# Patient Record
Sex: FEMALE | Race: WHITE | NOT HISPANIC OR LATINO | Employment: OTHER | URBAN - METROPOLITAN AREA
[De-identification: names, ages, dates, MRNs, and addresses within clinical notes are randomized per-mention and may not be internally consistent; named-entity substitution may affect disease eponyms.]

---

## 2017-01-01 ENCOUNTER — LAB CONVERSION - ENCOUNTER (OUTPATIENT)
Dept: OTHER | Facility: OTHER | Age: 67
End: 2017-01-01

## 2017-01-01 ENCOUNTER — GENERIC CONVERSION - ENCOUNTER (OUTPATIENT)
Dept: OTHER | Facility: OTHER | Age: 67
End: 2017-01-01

## 2017-01-01 ENCOUNTER — ALLSCRIPTS OFFICE VISIT (OUTPATIENT)
Dept: OTHER | Facility: OTHER | Age: 67
End: 2017-01-01

## 2017-01-01 ENCOUNTER — HOSPITAL ENCOUNTER (OUTPATIENT)
Dept: NON INVASIVE DIAGNOSTICS | Facility: CLINIC | Age: 67
Discharge: HOME | End: 2017-04-25
Payer: COMMERCIAL

## 2017-01-01 ENCOUNTER — ALLSCRIPTS OFFICE VISIT (OUTPATIENT)
Dept: RADIOLOGY | Facility: MEDICAL CENTER | Age: 67
End: 2017-01-01
Payer: COMMERCIAL

## 2017-01-01 ENCOUNTER — HOSPITAL ENCOUNTER (OUTPATIENT)
Dept: NON INVASIVE DIAGNOSTICS | Facility: CLINIC | Age: 67
Discharge: HOME | End: 2017-06-30
Payer: COMMERCIAL

## 2017-01-01 ENCOUNTER — HOSPITAL ENCOUNTER (OUTPATIENT)
Dept: MAMMOGRAPHY | Facility: HOSPITAL | Age: 67
Discharge: HOME | End: 2017-11-20
Payer: COMMERCIAL

## 2017-01-01 DIAGNOSIS — I65.23 OCCLUSION AND STENOSIS OF BILATERAL CAROTID ARTERIES: ICD-10-CM

## 2017-01-01 DIAGNOSIS — I73.9 PERIPHERAL VASCULAR DISEASE (HCC): ICD-10-CM

## 2017-01-01 DIAGNOSIS — E11.9 TYPE 2 DIABETES MELLITUS WITHOUT COMPLICATIONS (HCC): ICD-10-CM

## 2017-01-01 DIAGNOSIS — Z12.31 ENCOUNTER FOR SCREENING MAMMOGRAM FOR MALIGNANT NEOPLASM OF BREAST: ICD-10-CM

## 2017-01-01 LAB
A/G RATIO (HISTORICAL): 1.5 (CALC) (ref 1–2.5)
ALBUMIN SERPL BCP-MCNC: 3.7 G/DL (ref 3.6–5.1)
ALP SERPL-CCNC: 35 U/L (ref 33–130)
ALT SERPL W P-5'-P-CCNC: 9 U/L (ref 6–29)
AST SERPL W P-5'-P-CCNC: 12 U/L (ref 10–35)
BASOPHILS # BLD AUTO: 0.3 %
BASOPHILS # BLD AUTO: 29 CELLS/UL (ref 0–200)
BILIRUB SERPL-MCNC: 0.4 MG/DL (ref 0.2–1.2)
BUN SERPL-MCNC: 22 MG/DL (ref 7–25)
BUN/CREA RATIO (HISTORICAL): 18 (CALC) (ref 6–22)
CALCIUM SERPL-MCNC: 9.7 MG/DL (ref 8.6–10.4)
CHLORIDE SERPL-SCNC: 108 MMOL/L (ref 98–110)
CHOLEST SERPL-MCNC: 160 MG/DL (ref 125–200)
CHOLEST/HDLC SERPL: 3.3 (CALC)
CO2 SERPL-SCNC: 28 MMOL/L (ref 20–31)
CREAT SERPL-MCNC: 1.24 MG/DL (ref 0.5–0.99)
CREATININE, RANDOM URINE (HISTORICAL): 159 MG/DL (ref 20–320)
DEPRECATED RDW RBC AUTO: 16.8 % (ref 11–15)
EGFR AFRICAN AMERICAN (HISTORICAL): 51 ML/MIN/1.73M2
EGFR-AMERICAN CALC (HISTORICAL): 44 ML/MIN/1.73M2
EOSINOPHIL # BLD AUTO: 1.7 %
EOSINOPHIL # BLD AUTO: 163 CELLS/UL (ref 15–500)
GAMMA GLOBULIN (HISTORICAL): 2.4 G/DL (CALC) (ref 1.9–3.7)
GLUCOSE (HISTORICAL): 76 MG/DL (ref 65–99)
HBA1C MFR BLD HPLC: 5 % OF TOTAL HGB
HBA1C MFR BLD HPLC: 6.1 % OF TOTAL HGB
HCT VFR BLD AUTO: 42.7 % (ref 35–45)
HDLC SERPL-MCNC: 49 MG/DL
HGB BLD-MCNC: 14.1 G/DL (ref 11.7–15.5)
LDL CHOLESTEROL (HISTORICAL): 72 MG/DL (CALC)
LYMPHOCYTES # BLD AUTO: 3706 CELLS/UL (ref 850–3900)
LYMPHOCYTES # BLD AUTO: 38.6 %
MAGNESIUM, UR (HISTORICAL): 1.7 MG/DL
MCH RBC QN AUTO: 34.3 PG (ref 27–33)
MCHC RBC AUTO-ENTMCNC: 32.9 G/DL (ref 32–36)
MCV RBC AUTO: 104.1 FL (ref 80–100)
MICROALBUMIN/CREATININE RATIO (HISTORICAL): 11 MCG/MG CREAT
MONOCYTES # BLD AUTO: 931 CELLS/UL (ref 200–950)
MONOCYTES (HISTORICAL): 9.7 %
NEUTROPHILS # BLD AUTO: 4771 CELLS/UL (ref 1500–7800)
NEUTROPHILS # BLD AUTO: 49.7 %
NON-HDL-CHOL (CHOL-HDL) (HISTORICAL): 111 MG/DL (CALC)
PLATELET # BLD AUTO: 116 THOUSAND/UL (ref 140–400)
PMV BLD AUTO: 10.3 FL (ref 7.5–12.5)
POTASSIUM SERPL-SCNC: 4 MMOL/L (ref 3.5–5.3)
RBC # BLD AUTO: 4.1 MILLION/UL (ref 3.8–5.1)
SODIUM SERPL-SCNC: 142 MMOL/L (ref 135–146)
TESTOSTERONE FREE (HISTORICAL): 1.8 PG/ML (ref 0.1–6.4)
TESTOSTERONE TOTAL (HISTORICAL): 13 NG/DL (ref 2–45)
TOTAL PROTEIN (HISTORICAL): 6.1 G/DL (ref 6.1–8.1)
TRIGL SERPL-MCNC: 195 MG/DL
WBC # BLD AUTO: 9.6 THOUSAND/UL (ref 3.8–10.8)

## 2017-01-01 PROCEDURE — G0202 SCR MAMMO BI INCL CAD: HCPCS

## 2017-01-01 PROCEDURE — 93925 LOWER EXTREMITY STUDY: CPT

## 2017-01-01 PROCEDURE — 93923 UPR/LXTR ART STDY 3+ LVLS: CPT

## 2017-01-01 PROCEDURE — 93880 EXTRACRANIAL BILAT STUDY: CPT

## 2017-01-03 ENCOUNTER — HOSPITAL ENCOUNTER (OUTPATIENT)
Dept: RADIOLOGY | Facility: HOSPITAL | Age: 67
Discharge: HOME | End: 2017-01-03
Attending: SURGERY
Payer: COMMERCIAL

## 2017-01-03 VITALS
DIASTOLIC BLOOD PRESSURE: 60 MMHG | TEMPERATURE: 97.3 F | HEART RATE: 62 BPM | SYSTOLIC BLOOD PRESSURE: 127 MMHG | HEIGHT: 63 IN | RESPIRATION RATE: 16 BRPM | BODY MASS INDEX: 31.18 KG/M2 | WEIGHT: 176 LBS | OXYGEN SATURATION: 96 %

## 2017-01-03 DIAGNOSIS — I73.9 PERIPHERAL VASCULAR DISEASE, UNSPECIFIED (HCC): ICD-10-CM

## 2017-01-03 PROCEDURE — 37226 HB FEM/POPL REVASC W/STENT: CPT

## 2017-01-03 PROCEDURE — C1769 GUIDE WIRE: HCPCS

## 2017-01-03 PROCEDURE — C1725 CATH, TRANSLUMIN NON-LASER: HCPCS

## 2017-01-03 PROCEDURE — C1894 INTRO/SHEATH, NON-LASER: HCPCS

## 2017-01-03 PROCEDURE — C1876 STENT, NON-COA/NON-COV W/DEL: HCPCS

## 2017-01-03 PROCEDURE — 75625 CONTRAST EXAM ABDOMINL AORTA: CPT

## 2017-01-03 PROCEDURE — C1760 CLOSURE DEV, VASC: HCPCS

## 2017-01-03 PROCEDURE — 75710 ARTERY X-RAYS ARM/LEG: CPT

## 2017-01-03 RX ORDER — FENTANYL CITRATE 50 UG/ML
INJECTION, SOLUTION INTRAMUSCULAR; INTRAVENOUS CODE/TRAUMA/SEDATION MEDICATION
Status: COMPLETED | OUTPATIENT
Start: 2017-01-03 | End: 2017-01-03

## 2017-01-03 RX ORDER — MIDAZOLAM HYDROCHLORIDE 1 MG/ML
INJECTION INTRAMUSCULAR; INTRAVENOUS CODE/TRAUMA/SEDATION MEDICATION
Status: COMPLETED | OUTPATIENT
Start: 2017-01-03 | End: 2017-01-03

## 2017-01-03 RX ORDER — ACETAMINOPHEN 325 MG/1
650 TABLET ORAL ONCE
Status: COMPLETED | OUTPATIENT
Start: 2017-01-03 | End: 2017-01-03

## 2017-01-03 RX ORDER — SODIUM CHLORIDE 9 MG/ML
75 INJECTION, SOLUTION INTRAVENOUS CONTINUOUS
Status: DISCONTINUED | OUTPATIENT
Start: 2017-01-03 | End: 2017-01-04 | Stop reason: HOSPADM

## 2017-01-03 RX ORDER — CLOPIDOGREL BISULFATE 75 MG/1
75 TABLET ORAL DAILY
Status: DISCONTINUED | OUTPATIENT
Start: 2017-01-03 | End: 2017-01-04 | Stop reason: HOSPADM

## 2017-01-03 RX ORDER — HEPARIN SODIUM 1000 [USP'U]/ML
INJECTION, SOLUTION INTRAVENOUS; SUBCUTANEOUS CODE/TRAUMA/SEDATION MEDICATION
Status: COMPLETED | OUTPATIENT
Start: 2017-01-03 | End: 2017-01-03

## 2017-01-03 RX ADMIN — FENTANYL CITRATE 25 MCG: 50 INJECTION INTRAMUSCULAR; INTRAVENOUS at 09:46

## 2017-01-03 RX ADMIN — MIDAZOLAM 1 MG: 1 INJECTION INTRAMUSCULAR; INTRAVENOUS at 09:19

## 2017-01-03 RX ADMIN — CLOPIDOGREL BISULFATE 75 MG: 75 TABLET ORAL at 13:31

## 2017-01-03 RX ADMIN — FENTANYL CITRATE 25 MCG: 50 INJECTION INTRAMUSCULAR; INTRAVENOUS at 09:56

## 2017-01-03 RX ADMIN — FENTANYL CITRATE 50 MCG: 50 INJECTION INTRAMUSCULAR; INTRAVENOUS at 10:06

## 2017-01-03 RX ADMIN — MIDAZOLAM 1 MG: 1 INJECTION INTRAMUSCULAR; INTRAVENOUS at 08:33

## 2017-01-03 RX ADMIN — FENTANYL CITRATE 50 MCG: 50 INJECTION INTRAMUSCULAR; INTRAVENOUS at 08:33

## 2017-01-03 RX ADMIN — ACETAMINOPHEN 650 MG: 325 TABLET, FILM COATED ORAL at 13:15

## 2017-01-03 RX ADMIN — FENTANYL CITRATE 25 MCG: 50 INJECTION INTRAMUSCULAR; INTRAVENOUS at 09:53

## 2017-01-03 RX ADMIN — SODIUM CHLORIDE 75 ML/HR: 0.9 INJECTION, SOLUTION INTRAVENOUS at 07:00

## 2017-01-03 RX ADMIN — IODIXANOL 91.5 ML: 320 INJECTION, SOLUTION INTRAVASCULAR at 15:23

## 2017-01-03 RX ADMIN — HEPARIN SODIUM 5000 UNITS: 1000 INJECTION INTRAVENOUS; SUBCUTANEOUS at 09:19

## 2017-01-03 RX ADMIN — FENTANYL CITRATE 25 MCG: 50 INJECTION INTRAMUSCULAR; INTRAVENOUS at 09:19

## 2017-01-06 ENCOUNTER — ALLSCRIPTS OFFICE VISIT (OUTPATIENT)
Dept: OTHER | Facility: OTHER | Age: 67
End: 2017-01-06

## 2017-01-20 ENCOUNTER — GENERIC CONVERSION - ENCOUNTER (OUTPATIENT)
Dept: OTHER | Facility: OTHER | Age: 67
End: 2017-01-20

## 2017-01-25 ENCOUNTER — ALLSCRIPTS OFFICE VISIT (OUTPATIENT)
Dept: OTHER | Facility: OTHER | Age: 67
End: 2017-01-25

## 2017-02-14 ENCOUNTER — GENERIC CONVERSION - ENCOUNTER (OUTPATIENT)
Dept: OTHER | Facility: OTHER | Age: 67
End: 2017-02-14

## 2018-01-01 ENCOUNTER — ANESTHESIA (OUTPATIENT)
Dept: PERIOP | Facility: HOSPITAL | Age: 68
End: 2018-01-01
Payer: COMMERCIAL

## 2018-01-01 ENCOUNTER — HOSPITAL ENCOUNTER (OUTPATIENT)
Facility: HOSPITAL | Age: 68
Setting detail: OUTPATIENT SURGERY
Discharge: HOME | End: 2018-02-15
Attending: INTERNAL MEDICINE | Admitting: INTERNAL MEDICINE
Payer: COMMERCIAL

## 2018-01-01 ENCOUNTER — HOSPITAL ENCOUNTER (INPATIENT)
Facility: HOSPITAL | Age: 68
LOS: 3 days | Discharge: HOME | DRG: 683 | End: 2018-01-28
Attending: EMERGENCY MEDICINE | Admitting: INTERNAL MEDICINE
Payer: COMMERCIAL

## 2018-01-01 ENCOUNTER — HOSPITAL ENCOUNTER (OUTPATIENT)
Facility: HOSPITAL | Age: 68
Setting detail: OUTPATIENT SURGERY
End: 2018-01-01
Attending: INTERNAL MEDICINE | Admitting: INTERNAL MEDICINE
Payer: COMMERCIAL

## 2018-01-01 ENCOUNTER — OFFICE VISIT (OUTPATIENT)
Dept: VASCULAR SURGERY | Facility: CLINIC | Age: 68
End: 2018-01-01
Payer: COMMERCIAL

## 2018-01-01 ENCOUNTER — ANESTHESIA EVENT (OUTPATIENT)
Dept: PERIOP | Facility: HOSPITAL | Age: 68
End: 2018-01-01
Payer: COMMERCIAL

## 2018-01-01 ENCOUNTER — APPOINTMENT (EMERGENCY)
Dept: CT IMAGING | Facility: HOSPITAL | Age: 68
DRG: 683 | End: 2018-01-01
Payer: COMMERCIAL

## 2018-01-01 ENCOUNTER — TELEPHONE (OUTPATIENT)
Dept: FAMILY MEDICINE CLINIC | Facility: CLINIC | Age: 68
End: 2018-01-01

## 2018-01-01 ENCOUNTER — GENERIC CONVERSION - ENCOUNTER (OUTPATIENT)
Dept: OTHER | Facility: OTHER | Age: 68
End: 2018-01-01

## 2018-01-01 ENCOUNTER — APPOINTMENT (INPATIENT)
Dept: NON INVASIVE DIAGNOSTICS | Facility: HOSPITAL | Age: 68
DRG: 683 | End: 2018-01-01
Payer: COMMERCIAL

## 2018-01-01 ENCOUNTER — OFFICE VISIT (OUTPATIENT)
Dept: FAMILY MEDICINE CLINIC | Facility: CLINIC | Age: 68
End: 2018-01-01
Payer: COMMERCIAL

## 2018-01-01 ENCOUNTER — OFFICE VISIT (OUTPATIENT)
Dept: GASTROENTEROLOGY | Facility: MEDICAL CENTER | Age: 68
End: 2018-01-01
Payer: COMMERCIAL

## 2018-01-01 ENCOUNTER — TRANSITIONAL CARE MANAGEMENT (OUTPATIENT)
Dept: FAMILY MEDICINE CLINIC | Facility: CLINIC | Age: 68
End: 2018-01-01

## 2018-01-01 ENCOUNTER — TELEPHONE (OUTPATIENT)
Dept: VASCULAR SURGERY | Facility: CLINIC | Age: 68
End: 2018-01-01

## 2018-01-01 ENCOUNTER — GENERIC CONVERSION - ENCOUNTER (OUTPATIENT)
Dept: FAMILY MEDICINE CLINIC | Facility: CLINIC | Age: 68
End: 2018-01-01

## 2018-01-01 VITALS
RESPIRATION RATE: 12 BRPM | SYSTOLIC BLOOD PRESSURE: 136 MMHG | BODY MASS INDEX: 31.89 KG/M2 | HEART RATE: 64 BPM | DIASTOLIC BLOOD PRESSURE: 54 MMHG | HEIGHT: 63 IN | WEIGHT: 180 LBS

## 2018-01-01 VITALS
BODY MASS INDEX: 29.41 KG/M2 | OXYGEN SATURATION: 93 % | WEIGHT: 166 LBS | RESPIRATION RATE: 20 BRPM | HEIGHT: 63 IN | HEART RATE: 96 BPM | DIASTOLIC BLOOD PRESSURE: 99 MMHG | SYSTOLIC BLOOD PRESSURE: 132 MMHG | TEMPERATURE: 98.6 F

## 2018-01-01 VITALS
RESPIRATION RATE: 12 BRPM | DIASTOLIC BLOOD PRESSURE: 62 MMHG | BODY MASS INDEX: 33.31 KG/M2 | SYSTOLIC BLOOD PRESSURE: 140 MMHG | HEART RATE: 64 BPM | WEIGHT: 188 LBS | HEIGHT: 63 IN

## 2018-01-01 VITALS
WEIGHT: 176 LBS | SYSTOLIC BLOOD PRESSURE: 142 MMHG | SYSTOLIC BLOOD PRESSURE: 120 MMHG | HEART RATE: 54 BPM | DIASTOLIC BLOOD PRESSURE: 62 MMHG | HEIGHT: 63 IN | BODY MASS INDEX: 29.64 KG/M2 | OXYGEN SATURATION: 92 % | TEMPERATURE: 98 F | WEIGHT: 167.33 LBS | DIASTOLIC BLOOD PRESSURE: 78 MMHG | BODY MASS INDEX: 31.18 KG/M2 | RESPIRATION RATE: 16 BRPM

## 2018-01-01 VITALS
WEIGHT: 187 LBS | HEIGHT: 63 IN | BODY MASS INDEX: 33.13 KG/M2 | DIASTOLIC BLOOD PRESSURE: 78 MMHG | SYSTOLIC BLOOD PRESSURE: 118 MMHG

## 2018-01-01 VITALS
SYSTOLIC BLOOD PRESSURE: 132 MMHG | HEIGHT: 63 IN | DIASTOLIC BLOOD PRESSURE: 78 MMHG | WEIGHT: 186.5 LBS | BODY MASS INDEX: 33.04 KG/M2

## 2018-01-01 VITALS
DIASTOLIC BLOOD PRESSURE: 72 MMHG | HEART RATE: 72 BPM | BODY MASS INDEX: 31.82 KG/M2 | SYSTOLIC BLOOD PRESSURE: 132 MMHG | HEIGHT: 63 IN | WEIGHT: 179.6 LBS | RESPIRATION RATE: 14 BRPM

## 2018-01-01 VITALS
DIASTOLIC BLOOD PRESSURE: 70 MMHG | HEIGHT: 63 IN | SYSTOLIC BLOOD PRESSURE: 120 MMHG | TEMPERATURE: 95.2 F | BODY MASS INDEX: 31.56 KG/M2 | WEIGHT: 178.13 LBS

## 2018-01-01 VITALS
WEIGHT: 162 LBS | DIASTOLIC BLOOD PRESSURE: 70 MMHG | HEIGHT: 63 IN | TEMPERATURE: 98.5 F | SYSTOLIC BLOOD PRESSURE: 140 MMHG | BODY MASS INDEX: 28.7 KG/M2 | HEART RATE: 102 BPM

## 2018-01-01 VITALS
DIASTOLIC BLOOD PRESSURE: 84 MMHG | WEIGHT: 162.8 LBS | RESPIRATION RATE: 18 BRPM | BODY MASS INDEX: 28.84 KG/M2 | HEIGHT: 63 IN | SYSTOLIC BLOOD PRESSURE: 132 MMHG

## 2018-01-01 DIAGNOSIS — I65.23 BILATERAL CAROTID ARTERY STENOSIS: Primary | ICD-10-CM

## 2018-01-01 DIAGNOSIS — Z72.0 TOBACCO ABUSE: ICD-10-CM

## 2018-01-01 DIAGNOSIS — K27.9 PEPTIC ULCER DISEASE: ICD-10-CM

## 2018-01-01 DIAGNOSIS — E86.0 DEHYDRATION: Primary | ICD-10-CM

## 2018-01-01 DIAGNOSIS — N17.9 ACUTE RENAL FAILURE, UNSPECIFIED ACUTE RENAL FAILURE TYPE (HCC): Primary | ICD-10-CM

## 2018-01-01 DIAGNOSIS — Z94.0 RENAL TRANSPLANT, STATUS POST: ICD-10-CM

## 2018-01-01 DIAGNOSIS — N17.9 ACUTE RENAL FAILURE (ARF) (HCC): ICD-10-CM

## 2018-01-01 DIAGNOSIS — E11.22 TYPE 2 DIABETES MELLITUS WITH STAGE 3 CHRONIC KIDNEY DISEASE, WITHOUT LONG-TERM CURRENT USE OF INSULIN (HCC): ICD-10-CM

## 2018-01-01 DIAGNOSIS — K55.1 MESENTERIC ARTERY STENOSIS (HCC): ICD-10-CM

## 2018-01-01 DIAGNOSIS — I73.9 PAD (PERIPHERAL ARTERY DISEASE) (HCC): ICD-10-CM

## 2018-01-01 DIAGNOSIS — N18.30 CHRONIC KIDNEY DISEASE, STAGE 3 (HCC): ICD-10-CM

## 2018-01-01 DIAGNOSIS — K55.1 MESENTERIC ARTERY STENOSIS (HCC): Primary | ICD-10-CM

## 2018-01-01 DIAGNOSIS — K55.1 CHRONIC MESENTERIC ISCHEMIA (HCC): ICD-10-CM

## 2018-01-01 DIAGNOSIS — N18.30 TYPE 2 DIABETES MELLITUS WITH STAGE 3 CHRONIC KIDNEY DISEASE, WITHOUT LONG-TERM CURRENT USE OF INSULIN (HCC): ICD-10-CM

## 2018-01-01 DIAGNOSIS — K27.9 PUD (PEPTIC ULCER DISEASE): Primary | ICD-10-CM

## 2018-01-01 DIAGNOSIS — K21.9 GERD WITHOUT ESOPHAGITIS: Primary | ICD-10-CM

## 2018-01-01 DIAGNOSIS — I25.10 CORONARY ARTERY DISEASE INVOLVING NATIVE CORONARY ARTERY OF NATIVE HEART WITHOUT ANGINA PECTORIS: ICD-10-CM

## 2018-01-01 DIAGNOSIS — K55.1 SMA STENOSIS: ICD-10-CM

## 2018-01-01 DIAGNOSIS — R10.13 EPIGASTRIC ABDOMINAL PAIN: ICD-10-CM

## 2018-01-01 DIAGNOSIS — I10 ESSENTIAL HYPERTENSION: ICD-10-CM

## 2018-01-01 DIAGNOSIS — I71.4 ABDOMINAL AORTIC ANEURYSM (AAA) 3.0 CM TO 5.0 CM IN DIAMETER IN FEMALE (HCC): ICD-10-CM

## 2018-01-01 LAB
ALBUMIN SERPL BCP-MCNC: 2.9 G/DL (ref 3.5–5)
ALP SERPL-CCNC: 51 U/L (ref 46–116)
ALT SERPL W P-5'-P-CCNC: 14 U/L (ref 12–78)
ANION GAP SERPL CALCULATED.3IONS-SCNC: 11 MMOL/L (ref 4–13)
ANION GAP SERPL CALCULATED.3IONS-SCNC: 6 MMOL/L (ref 4–13)
ANION GAP SERPL CALCULATED.3IONS-SCNC: 7 MMOL/L (ref 4–13)
ANION GAP SERPL CALCULATED.3IONS-SCNC: 8 MMOL/L (ref 4–13)
AST SERPL W P-5'-P-CCNC: 16 U/L (ref 5–45)
BASOPHILS # BLD AUTO: 0.01 THOUSANDS/ΜL (ref 0–0.1)
BASOPHILS NFR BLD AUTO: 0 % (ref 0–1)
BILIRUB SERPL-MCNC: 0.56 MG/DL (ref 0.2–1)
BUN SERPL-MCNC: 10 MG/DL (ref 5–25)
BUN SERPL-MCNC: 13 MG/DL (ref 5–25)
BUN SERPL-MCNC: 17 MG/DL (ref 5–25)
BUN SERPL-MCNC: 9 MG/DL (ref 5–25)
C DIFF TOX GENS STL QL NAA+PROBE: NORMAL
CALCIUM SERPL-MCNC: 8.6 MG/DL (ref 8.3–10.1)
CALCIUM SERPL-MCNC: 8.7 MG/DL (ref 8.3–10.1)
CALCIUM SERPL-MCNC: 9.2 MG/DL (ref 8.3–10.1)
CALCIUM SERPL-MCNC: 9.7 MG/DL (ref 8.3–10.1)
CAMPYLOBACTER DNA SPEC NAA+PROBE: NORMAL
CHLORIDE SERPL-SCNC: 102 MMOL/L (ref 100–108)
CHLORIDE SERPL-SCNC: 106 MMOL/L (ref 100–108)
CHLORIDE SERPL-SCNC: 107 MMOL/L (ref 100–108)
CHLORIDE SERPL-SCNC: 109 MMOL/L (ref 100–108)
CO2 SERPL-SCNC: 26 MMOL/L (ref 21–32)
CO2 SERPL-SCNC: 26 MMOL/L (ref 21–32)
CO2 SERPL-SCNC: 28 MMOL/L (ref 21–32)
CO2 SERPL-SCNC: 28 MMOL/L (ref 21–32)
CREAT SERPL-MCNC: 1.04 MG/DL (ref 0.6–1.3)
CREAT SERPL-MCNC: 1.04 MG/DL (ref 0.6–1.3)
CREAT SERPL-MCNC: 1.23 MG/DL (ref 0.6–1.3)
CREAT SERPL-MCNC: 1.63 MG/DL (ref 0.6–1.3)
EOSINOPHIL # BLD AUTO: 0.06 THOUSAND/ΜL (ref 0–0.61)
EOSINOPHIL NFR BLD AUTO: 1 % (ref 0–6)
ERYTHROCYTE [DISTWIDTH] IN BLOOD BY AUTOMATED COUNT: 16 % (ref 11.6–15.1)
ERYTHROCYTE [DISTWIDTH] IN BLOOD BY AUTOMATED COUNT: 16.1 % (ref 11.6–15.1)
GFR SERPL CREATININE-BSD FRML MDRD: 32 ML/MIN/1.73SQ M
GFR SERPL CREATININE-BSD FRML MDRD: 45 ML/MIN/1.73SQ M
GFR SERPL CREATININE-BSD FRML MDRD: 55 ML/MIN/1.73SQ M
GFR SERPL CREATININE-BSD FRML MDRD: 55 ML/MIN/1.73SQ M
GLUCOSE SERPL-MCNC: 101 MG/DL (ref 65–140)
GLUCOSE SERPL-MCNC: 103 MG/DL (ref 65–140)
GLUCOSE SERPL-MCNC: 125 MG/DL (ref 65–140)
GLUCOSE SERPL-MCNC: 167 MG/DL (ref 65–140)
HCT VFR BLD AUTO: 50.4 % (ref 34.8–46.1)
HCT VFR BLD AUTO: 54.4 % (ref 34.8–46.1)
HGB BLD-MCNC: 15.9 G/DL (ref 11.5–15.4)
HGB BLD-MCNC: 18.3 G/DL (ref 11.5–15.4)
LIPASE SERPL-CCNC: 63 U/L (ref 73–393)
LYMPHOCYTES # BLD AUTO: 3.22 THOUSANDS/ΜL (ref 0.6–4.47)
LYMPHOCYTES NFR BLD AUTO: 28 % (ref 14–44)
MAGNESIUM SERPL-MCNC: 2 MG/DL (ref 1.6–2.6)
MCH RBC QN AUTO: 32.3 PG (ref 26.8–34.3)
MCH RBC QN AUTO: 34.3 PG (ref 26.8–34.3)
MCHC RBC AUTO-ENTMCNC: 31.5 G/DL (ref 31.4–37.4)
MCHC RBC AUTO-ENTMCNC: 33.6 G/DL (ref 31.4–37.4)
MCV RBC AUTO: 102 FL (ref 82–98)
MCV RBC AUTO: 102 FL (ref 82–98)
MONOCYTES # BLD AUTO: 1.16 THOUSAND/ΜL (ref 0.17–1.22)
MONOCYTES NFR BLD AUTO: 10 % (ref 4–12)
NEUTROPHILS # BLD AUTO: 6.94 THOUSANDS/ΜL (ref 1.85–7.62)
NEUTS SEG NFR BLD AUTO: 61 % (ref 43–75)
NRBC BLD AUTO-RTO: 0 /100 WBCS
PLATELET # BLD AUTO: 104 THOUSANDS/UL (ref 149–390)
PLATELET # BLD AUTO: 119 THOUSANDS/UL (ref 149–390)
PMV BLD AUTO: 12 FL (ref 8.9–12.7)
PMV BLD AUTO: 12.9 FL (ref 8.9–12.7)
POTASSIUM SERPL-SCNC: 3.2 MMOL/L (ref 3.5–5.3)
POTASSIUM SERPL-SCNC: 3.6 MMOL/L (ref 3.5–5.3)
POTASSIUM SERPL-SCNC: 3.7 MMOL/L (ref 3.5–5.3)
POTASSIUM SERPL-SCNC: 3.8 MMOL/L (ref 3.5–5.3)
PROT SERPL-MCNC: 6.5 G/DL (ref 6.4–8.2)
RBC # BLD AUTO: 4.92 MILLION/UL (ref 3.81–5.12)
RBC # BLD AUTO: 5.33 MILLION/UL (ref 3.81–5.12)
SALMONELLA DNA SPEC QL NAA+PROBE: NORMAL
SHIGA TOXIN STX GENE SPEC NAA+PROBE: NORMAL
SHIGELLA DNA SPEC QL NAA+PROBE: NORMAL
SODIUM SERPL-SCNC: 140 MMOL/L (ref 136–145)
SODIUM SERPL-SCNC: 140 MMOL/L (ref 136–145)
SODIUM SERPL-SCNC: 141 MMOL/L (ref 136–145)
SODIUM SERPL-SCNC: 143 MMOL/L (ref 136–145)
TACROLIMUS BLD-MCNC: 4.8 NG/ML (ref 2–20)
VALPROATE SERPL-MCNC: 13 UG/ML (ref 50–100)
WBC # BLD AUTO: 11.39 THOUSAND/UL (ref 4.31–10.16)
WBC # BLD AUTO: 7.84 THOUSAND/UL (ref 4.31–10.16)

## 2018-01-01 PROCEDURE — 99223 1ST HOSP IP/OBS HIGH 75: CPT | Performed by: PHYSICIAN ASSISTANT

## 2018-01-01 PROCEDURE — 99495 TRANSJ CARE MGMT MOD F2F 14D: CPT | Performed by: FAMILY MEDICINE

## 2018-01-01 PROCEDURE — 80048 BASIC METABOLIC PNL TOTAL CA: CPT | Performed by: INTERNAL MEDICINE

## 2018-01-01 PROCEDURE — 88342 IMHCHEM/IMCYTCHM 1ST ANTB: CPT | Performed by: PATHOLOGY

## 2018-01-01 PROCEDURE — 80048 BASIC METABOLIC PNL TOTAL CA: CPT | Performed by: PHYSICIAN ASSISTANT

## 2018-01-01 PROCEDURE — 99239 HOSP IP/OBS DSCHRG MGMT >30: CPT | Performed by: INTERNAL MEDICINE

## 2018-01-01 PROCEDURE — 80053 COMPREHEN METABOLIC PANEL: CPT | Performed by: EMERGENCY MEDICINE

## 2018-01-01 PROCEDURE — 88342 IMHCHEM/IMCYTCHM 1ST ANTB: CPT | Performed by: INTERNAL MEDICINE

## 2018-01-01 PROCEDURE — 85027 COMPLETE CBC AUTOMATED: CPT | Performed by: PHYSICIAN ASSISTANT

## 2018-01-01 PROCEDURE — 74176 CT ABD & PELVIS W/O CONTRAST: CPT

## 2018-01-01 PROCEDURE — 99232 SBSQ HOSP IP/OBS MODERATE 35: CPT | Performed by: INTERNAL MEDICINE

## 2018-01-01 PROCEDURE — 85025 COMPLETE CBC W/AUTO DIFF WBC: CPT | Performed by: EMERGENCY MEDICINE

## 2018-01-01 PROCEDURE — 80197 ASSAY OF TACROLIMUS: CPT | Performed by: PHYSICIAN ASSISTANT

## 2018-01-01 PROCEDURE — 83690 ASSAY OF LIPASE: CPT | Performed by: EMERGENCY MEDICINE

## 2018-01-01 PROCEDURE — 99214 OFFICE O/P EST MOD 30 MIN: CPT | Performed by: SURGERY

## 2018-01-01 PROCEDURE — 93975 VASCULAR STUDY: CPT

## 2018-01-01 PROCEDURE — 93976 VASCULAR STUDY: CPT | Performed by: SURGERY

## 2018-01-01 PROCEDURE — 87505 NFCT AGENT DETECTION GI: CPT | Performed by: EMERGENCY MEDICINE

## 2018-01-01 PROCEDURE — 36415 COLL VENOUS BLD VENIPUNCTURE: CPT | Performed by: EMERGENCY MEDICINE

## 2018-01-01 PROCEDURE — 88305 TISSUE EXAM BY PATHOLOGIST: CPT | Performed by: PATHOLOGY

## 2018-01-01 PROCEDURE — 96361 HYDRATE IV INFUSION ADD-ON: CPT

## 2018-01-01 PROCEDURE — 43239 EGD BIOPSY SINGLE/MULTIPLE: CPT | Performed by: INTERNAL MEDICINE

## 2018-01-01 PROCEDURE — 88305 TISSUE EXAM BY PATHOLOGIST: CPT | Performed by: INTERNAL MEDICINE

## 2018-01-01 PROCEDURE — 99285 EMERGENCY DEPT VISIT HI MDM: CPT

## 2018-01-01 PROCEDURE — 87493 C DIFF AMPLIFIED PROBE: CPT | Performed by: EMERGENCY MEDICINE

## 2018-01-01 PROCEDURE — 4040F PNEUMOC VAC/ADMIN/RCVD: CPT | Performed by: INTERNAL MEDICINE

## 2018-01-01 PROCEDURE — 83735 ASSAY OF MAGNESIUM: CPT | Performed by: PHYSICIAN ASSISTANT

## 2018-01-01 PROCEDURE — 80164 ASSAY DIPROPYLACETIC ACD TOT: CPT | Performed by: EMERGENCY MEDICINE

## 2018-01-01 PROCEDURE — 99222 1ST HOSP IP/OBS MODERATE 55: CPT | Performed by: PHYSICIAN ASSISTANT

## 2018-01-01 PROCEDURE — 99204 OFFICE O/P NEW MOD 45 MIN: CPT | Performed by: INTERNAL MEDICINE

## 2018-01-01 PROCEDURE — 96360 HYDRATION IV INFUSION INIT: CPT

## 2018-01-01 RX ORDER — ISOSORBIDE DINITRATE 30 MG/1
30 TABLET ORAL DAILY
COMMUNITY

## 2018-01-01 RX ORDER — CHLORAL HYDRATE 500 MG
1000 CAPSULE ORAL DAILY
Status: DISCONTINUED | OUTPATIENT
Start: 2018-01-01 | End: 2018-01-01 | Stop reason: HOSPADM

## 2018-01-01 RX ORDER — POTASSIUM CHLORIDE 20 MEQ/1
40 TABLET, EXTENDED RELEASE ORAL ONCE
Status: COMPLETED | OUTPATIENT
Start: 2018-01-01 | End: 2018-01-01

## 2018-01-01 RX ORDER — DIVALPROEX SODIUM 500 MG/1
500 TABLET, DELAYED RELEASE ORAL 2 TIMES DAILY
Status: DISCONTINUED | OUTPATIENT
Start: 2018-01-01 | End: 2018-01-01 | Stop reason: HOSPADM

## 2018-01-01 RX ORDER — CLONAZEPAM 0.5 MG/1
0.5 TABLET ORAL EVERY EVENING
Status: DISCONTINUED | OUTPATIENT
Start: 2018-01-01 | End: 2018-01-01 | Stop reason: HOSPADM

## 2018-01-01 RX ORDER — PANTOPRAZOLE SODIUM 40 MG/1
40 TABLET, DELAYED RELEASE ORAL
Qty: 60 TABLET | Refills: 2 | Status: SHIPPED | OUTPATIENT
Start: 2018-01-01

## 2018-01-01 RX ORDER — CALCIUM CARBONATE 500(1250)
1 TABLET ORAL
Status: DISCONTINUED | OUTPATIENT
Start: 2018-01-01 | End: 2018-01-01 | Stop reason: HOSPADM

## 2018-01-01 RX ORDER — MYCOPHENOLIC ACID 180 MG/1
360 TABLET, DELAYED RELEASE ORAL 2 TIMES DAILY
Status: DISCONTINUED | OUTPATIENT
Start: 2018-01-01 | End: 2018-01-01 | Stop reason: HOSPADM

## 2018-01-01 RX ORDER — CLOPIDOGREL BISULFATE 75 MG/1
75 TABLET ORAL DAILY
Status: DISCONTINUED | OUTPATIENT
Start: 2018-01-01 | End: 2018-01-01 | Stop reason: HOSPADM

## 2018-01-01 RX ORDER — AMOXICILLIN 500 MG
1200 CAPSULE ORAL 2 TIMES DAILY
Refills: 0
Start: 2018-01-01

## 2018-01-01 RX ORDER — PANTOPRAZOLE SODIUM 40 MG/1
40 TABLET, DELAYED RELEASE ORAL
Status: DISCONTINUED | OUTPATIENT
Start: 2018-01-01 | End: 2018-01-01 | Stop reason: HOSPADM

## 2018-01-01 RX ORDER — PRAVASTATIN SODIUM 80 MG/1
80 TABLET ORAL
Status: DISCONTINUED | OUTPATIENT
Start: 2018-01-01 | End: 2018-01-01 | Stop reason: HOSPADM

## 2018-01-01 RX ORDER — TACROLIMUS 1 MG/1
2 CAPSULE ORAL 2 TIMES DAILY
Refills: 0
Start: 2018-01-01

## 2018-01-01 RX ORDER — VARENICLINE TARTRATE 25 MG
KIT ORAL
Qty: 53 TABLET | Refills: 0 | Status: SHIPPED | OUTPATIENT
Start: 2018-01-01

## 2018-01-01 RX ORDER — HEPARIN SODIUM 5000 [USP'U]/ML
5000 INJECTION, SOLUTION INTRAVENOUS; SUBCUTANEOUS EVERY 8 HOURS SCHEDULED
Status: DISCONTINUED | OUTPATIENT
Start: 2018-01-01 | End: 2018-01-01 | Stop reason: HOSPADM

## 2018-01-01 RX ORDER — FLUOXETINE HYDROCHLORIDE 20 MG/1
20 CAPSULE ORAL DAILY
Status: DISCONTINUED | OUTPATIENT
Start: 2018-01-01 | End: 2018-01-01 | Stop reason: HOSPADM

## 2018-01-01 RX ORDER — SODIUM CHLORIDE 9 MG/ML
250 INJECTION, SOLUTION INTRAVENOUS CONTINUOUS
Status: DISCONTINUED | OUTPATIENT
Start: 2018-01-01 | End: 2018-01-01

## 2018-01-01 RX ORDER — RANOLAZINE 500 MG/1
500 TABLET, EXTENDED RELEASE ORAL EVERY 12 HOURS
Status: DISCONTINUED | OUTPATIENT
Start: 2018-01-01 | End: 2018-01-01 | Stop reason: HOSPADM

## 2018-01-01 RX ORDER — PREDNISONE 1 MG/1
5 TABLET ORAL DAILY
Status: DISCONTINUED | OUTPATIENT
Start: 2018-01-01 | End: 2018-01-01 | Stop reason: HOSPADM

## 2018-01-01 RX ORDER — PROPOFOL 10 MG/ML
INJECTION, EMULSION INTRAVENOUS AS NEEDED
Status: DISCONTINUED | OUTPATIENT
Start: 2018-01-01 | End: 2018-01-01 | Stop reason: SURG

## 2018-01-01 RX ORDER — ISOSORBIDE MONONITRATE 30 MG/1
30 TABLET, EXTENDED RELEASE ORAL DAILY
Status: DISCONTINUED | OUTPATIENT
Start: 2018-01-01 | End: 2018-01-01 | Stop reason: HOSPADM

## 2018-01-01 RX ORDER — ONDANSETRON 2 MG/ML
4 INJECTION INTRAMUSCULAR; INTRAVENOUS ONCE
Status: COMPLETED | OUTPATIENT
Start: 2018-01-01 | End: 2018-01-01

## 2018-01-01 RX ORDER — HYDRALAZINE HYDROCHLORIDE 20 MG/ML
5 INJECTION INTRAMUSCULAR; INTRAVENOUS EVERY 6 HOURS PRN
Status: DISCONTINUED | OUTPATIENT
Start: 2018-01-01 | End: 2018-01-01 | Stop reason: HOSPADM

## 2018-01-01 RX ORDER — ACETAMINOPHEN 325 MG/1
650 TABLET ORAL EVERY 6 HOURS PRN
Status: DISCONTINUED | OUTPATIENT
Start: 2018-01-01 | End: 2018-01-01 | Stop reason: HOSPADM

## 2018-01-01 RX ORDER — ASPIRIN 81 MG/1
81 TABLET, CHEWABLE ORAL EVERY EVENING
Status: DISCONTINUED | OUTPATIENT
Start: 2018-01-01 | End: 2018-01-01 | Stop reason: HOSPADM

## 2018-01-01 RX ORDER — DULOXETIN HYDROCHLORIDE 30 MG/1
30 CAPSULE, DELAYED RELEASE ORAL DAILY
Status: DISCONTINUED | OUTPATIENT
Start: 2018-01-01 | End: 2018-01-01 | Stop reason: HOSPADM

## 2018-01-01 RX ORDER — SODIUM CHLORIDE 9 MG/ML
INJECTION, SOLUTION INTRAVENOUS CONTINUOUS PRN
Status: DISCONTINUED | OUTPATIENT
Start: 2018-01-01 | End: 2018-01-01 | Stop reason: SURG

## 2018-01-01 RX ORDER — TACROLIMUS 1 MG/1
1 CAPSULE ORAL 2 TIMES DAILY
Status: DISCONTINUED | OUTPATIENT
Start: 2018-01-01 | End: 2018-01-01 | Stop reason: HOSPADM

## 2018-01-01 RX ORDER — CALCIUM CARBONATE 200(500)MG
1000 TABLET,CHEWABLE ORAL DAILY PRN
Status: DISCONTINUED | OUTPATIENT
Start: 2018-01-01 | End: 2018-01-01 | Stop reason: HOSPADM

## 2018-01-01 RX ORDER — SODIUM CHLORIDE 9 MG/ML
50 INJECTION, SOLUTION INTRAVENOUS CONTINUOUS
Status: DISCONTINUED | OUTPATIENT
Start: 2018-01-01 | End: 2018-01-01 | Stop reason: HOSPADM

## 2018-01-01 RX ORDER — ONDANSETRON 2 MG/ML
4 INJECTION INTRAMUSCULAR; INTRAVENOUS EVERY 6 HOURS PRN
Status: DISCONTINUED | OUTPATIENT
Start: 2018-01-01 | End: 2018-01-01 | Stop reason: HOSPADM

## 2018-01-01 RX ORDER — ALENDRONATE SODIUM 70 MG/1
70 TABLET ORAL
Status: ON HOLD | COMMUNITY
End: 2018-01-01 | Stop reason: CLARIF

## 2018-01-01 RX ADMIN — PREDNISONE 5 MG: 5 TABLET ORAL at 15:34

## 2018-01-01 RX ADMIN — DULOXETINE 30 MG: 30 CAPSULE, DELAYED RELEASE ORAL at 15:34

## 2018-01-01 RX ADMIN — PRAVASTATIN SODIUM 80 MG: 80 TABLET ORAL at 15:34

## 2018-01-01 RX ADMIN — MYCOPHENOLIC ACID 360 MG: 180 TABLET, DELAYED RELEASE ORAL at 17:12

## 2018-01-01 RX ADMIN — PREDNISONE 5 MG: 5 TABLET ORAL at 08:42

## 2018-01-01 RX ADMIN — METOPROLOL TARTRATE 25 MG: 25 TABLET ORAL at 17:13

## 2018-01-01 RX ADMIN — DULOXETINE 30 MG: 30 CAPSULE, DELAYED RELEASE ORAL at 08:42

## 2018-01-01 RX ADMIN — Medication 1000 MG: at 08:42

## 2018-01-01 RX ADMIN — SODIUM CHLORIDE 50 ML/HR: 0.9 INJECTION, SOLUTION INTRAVENOUS at 19:28

## 2018-01-01 RX ADMIN — ISOSORBIDE MONONITRATE 30 MG: 30 TABLET, EXTENDED RELEASE ORAL at 09:32

## 2018-01-01 RX ADMIN — CLONAZEPAM 0.5 MG: 0.5 TABLET ORAL at 17:36

## 2018-01-01 RX ADMIN — PRAVASTATIN SODIUM 80 MG: 80 TABLET ORAL at 17:12

## 2018-01-01 RX ADMIN — SODIUM CHLORIDE 50 ML/HR: 0.9 INJECTION, SOLUTION INTRAVENOUS at 23:42

## 2018-01-01 RX ADMIN — DIVALPROEX SODIUM 500 MG: 500 TABLET, DELAYED RELEASE ORAL at 09:08

## 2018-01-01 RX ADMIN — ISOSORBIDE MONONITRATE 30 MG: 30 TABLET, EXTENDED RELEASE ORAL at 08:42

## 2018-01-01 RX ADMIN — ISOSORBIDE MONONITRATE 30 MG: 30 TABLET, EXTENDED RELEASE ORAL at 15:34

## 2018-01-01 RX ADMIN — TACROLIMUS 1 MG: 1 CAPSULE ORAL at 18:48

## 2018-01-01 RX ADMIN — TACROLIMUS 1 MG: 1 CAPSULE ORAL at 09:09

## 2018-01-01 RX ADMIN — MYCOPHENOLIC ACID 360 MG: 180 TABLET, DELAYED RELEASE ORAL at 18:48

## 2018-01-01 RX ADMIN — PANTOPRAZOLE SODIUM 40 MG: 40 TABLET, DELAYED RELEASE ORAL at 05:13

## 2018-01-01 RX ADMIN — ASPIRIN 81 MG 81 MG: 81 TABLET ORAL at 17:13

## 2018-01-01 RX ADMIN — METOPROLOL TARTRATE 25 MG: 25 TABLET ORAL at 17:36

## 2018-01-01 RX ADMIN — ASPIRIN 81 MG 81 MG: 81 TABLET ORAL at 17:34

## 2018-01-01 RX ADMIN — DIVALPROEX SODIUM 500 MG: 500 TABLET, DELAYED RELEASE ORAL at 17:34

## 2018-01-01 RX ADMIN — PANTOPRAZOLE SODIUM 40 MG: 40 TABLET, DELAYED RELEASE ORAL at 05:15

## 2018-01-01 RX ADMIN — Medication 1000 MG: at 09:08

## 2018-01-01 RX ADMIN — IOHEXOL 50 ML: 240 INJECTION, SOLUTION INTRATHECAL; INTRAVASCULAR; INTRAVENOUS; ORAL at 11:07

## 2018-01-01 RX ADMIN — PROPOFOL 120 MG: 10 INJECTION, EMULSION INTRAVENOUS at 11:54

## 2018-01-01 RX ADMIN — METOPROLOL TARTRATE 25 MG: 25 TABLET ORAL at 17:34

## 2018-01-01 RX ADMIN — SODIUM CHLORIDE 100 ML/HR: 0.9 INJECTION, SOLUTION INTRAVENOUS at 07:59

## 2018-01-01 RX ADMIN — Medication 400 MG: at 08:42

## 2018-01-01 RX ADMIN — Medication 400 MG: at 15:35

## 2018-01-01 RX ADMIN — CLONAZEPAM 0.5 MG: 0.5 TABLET ORAL at 17:34

## 2018-01-01 RX ADMIN — FLUOXETINE 20 MG: 20 CAPSULE ORAL at 15:34

## 2018-01-01 RX ADMIN — MYCOPHENOLIC ACID 360 MG: 180 TABLET, DELAYED RELEASE ORAL at 08:42

## 2018-01-01 RX ADMIN — SODIUM CHLORIDE 1000 ML: 0.9 INJECTION, SOLUTION INTRAVENOUS at 08:54

## 2018-01-01 RX ADMIN — CLOPIDOGREL BISULFATE 75 MG: 75 TABLET ORAL at 09:07

## 2018-01-01 RX ADMIN — MYCOPHENOLIC ACID 360 MG: 180 TABLET, DELAYED RELEASE ORAL at 09:31

## 2018-01-01 RX ADMIN — ONDANSETRON 4 MG: 2 INJECTION INTRAMUSCULAR; INTRAVENOUS at 12:25

## 2018-01-01 RX ADMIN — DIVALPROEX SODIUM 500 MG: 500 TABLET, DELAYED RELEASE ORAL at 17:36

## 2018-01-01 RX ADMIN — ACETAMINOPHEN 650 MG: 325 TABLET, FILM COATED ORAL at 09:34

## 2018-01-01 RX ADMIN — HEPARIN SODIUM 5000 UNITS: 5000 INJECTION, SOLUTION INTRAVENOUS; SUBCUTANEOUS at 06:03

## 2018-01-01 RX ADMIN — DIVALPROEX SODIUM 500 MG: 500 TABLET, DELAYED RELEASE ORAL at 17:12

## 2018-01-01 RX ADMIN — Medication 1 TABLET: at 09:08

## 2018-01-01 RX ADMIN — MYCOPHENOLIC ACID 360 MG: 180 TABLET, DELAYED RELEASE ORAL at 09:07

## 2018-01-01 RX ADMIN — Medication 400 MG: at 09:31

## 2018-01-01 RX ADMIN — DULOXETINE 30 MG: 30 CAPSULE, DELAYED RELEASE ORAL at 09:32

## 2018-01-01 RX ADMIN — ACETAMINOPHEN 650 MG: 325 TABLET, FILM COATED ORAL at 08:34

## 2018-01-01 RX ADMIN — FLUOXETINE 20 MG: 20 CAPSULE ORAL at 09:32

## 2018-01-01 RX ADMIN — METOPROLOL TARTRATE 25 MG: 25 TABLET ORAL at 09:08

## 2018-01-01 RX ADMIN — Medication 1000 MG: at 15:35

## 2018-01-01 RX ADMIN — Medication 1 TABLET: at 09:32

## 2018-01-01 RX ADMIN — PREDNISONE 5 MG: 5 TABLET ORAL at 09:32

## 2018-01-01 RX ADMIN — Medication 400 MG: at 09:09

## 2018-01-01 RX ADMIN — MYCOPHENOLIC ACID 360 MG: 180 TABLET, DELAYED RELEASE ORAL at 17:34

## 2018-01-01 RX ADMIN — TACROLIMUS 1 MG: 1 CAPSULE ORAL at 08:42

## 2018-01-01 RX ADMIN — RANOLAZINE 500 MG: 500 TABLET, FILM COATED, EXTENDED RELEASE ORAL at 21:11

## 2018-01-01 RX ADMIN — FOLIC ACID-PYRIDOXINE-CYANOCOBALAMIN TAB 2.5-25-2 MG 1 TABLET: 2.5-25-2 TAB at 09:31

## 2018-01-01 RX ADMIN — RANOLAZINE 500 MG: 500 TABLET, FILM COATED, EXTENDED RELEASE ORAL at 09:31

## 2018-01-01 RX ADMIN — ASPIRIN 81 MG 81 MG: 81 TABLET ORAL at 17:36

## 2018-01-01 RX ADMIN — METOPROLOL TARTRATE 25 MG: 25 TABLET ORAL at 08:42

## 2018-01-01 RX ADMIN — Medication 1000 MG: at 09:31

## 2018-01-01 RX ADMIN — TACROLIMUS 1 MG: 1 CAPSULE ORAL at 17:34

## 2018-01-01 RX ADMIN — PANTOPRAZOLE SODIUM 40 MG: 40 TABLET, DELAYED RELEASE ORAL at 06:03

## 2018-01-01 RX ADMIN — TACROLIMUS 1 MG: 1 CAPSULE ORAL at 09:31

## 2018-01-01 RX ADMIN — CLONAZEPAM 0.5 MG: 0.5 TABLET ORAL at 17:12

## 2018-01-01 RX ADMIN — SODIUM CHLORIDE 250 ML/HR: 0.9 INJECTION, SOLUTION INTRAVENOUS at 13:57

## 2018-01-01 RX ADMIN — METOPROLOL TARTRATE 25 MG: 25 TABLET ORAL at 09:31

## 2018-01-01 RX ADMIN — HEPARIN SODIUM 5000 UNITS: 5000 INJECTION, SOLUTION INTRAVENOUS; SUBCUTANEOUS at 13:21

## 2018-01-01 RX ADMIN — PRAVASTATIN SODIUM 80 MG: 80 TABLET ORAL at 17:34

## 2018-01-01 RX ADMIN — POTASSIUM CHLORIDE 40 MEQ: 1500 TABLET, EXTENDED RELEASE ORAL at 10:25

## 2018-01-01 RX ADMIN — RANOLAZINE 500 MG: 500 TABLET, FILM COATED, EXTENDED RELEASE ORAL at 09:09

## 2018-01-01 RX ADMIN — SODIUM CHLORIDE 100 ML/HR: 0.9 INJECTION, SOLUTION INTRAVENOUS at 15:41

## 2018-01-01 RX ADMIN — CLOPIDOGREL BISULFATE 75 MG: 75 TABLET ORAL at 15:34

## 2018-01-01 RX ADMIN — HEPARIN SODIUM 5000 UNITS: 5000 INJECTION, SOLUTION INTRAVENOUS; SUBCUTANEOUS at 05:13

## 2018-01-01 RX ADMIN — HEPARIN SODIUM 5000 UNITS: 5000 INJECTION, SOLUTION INTRAVENOUS; SUBCUTANEOUS at 21:43

## 2018-01-01 RX ADMIN — RANOLAZINE 500 MG: 500 TABLET, FILM COATED, EXTENDED RELEASE ORAL at 21:43

## 2018-01-01 RX ADMIN — DULOXETINE 30 MG: 30 CAPSULE, DELAYED RELEASE ORAL at 09:09

## 2018-01-01 RX ADMIN — ISOSORBIDE MONONITRATE 30 MG: 30 TABLET, EXTENDED RELEASE ORAL at 09:08

## 2018-01-01 RX ADMIN — FOLIC ACID-PYRIDOXINE-CYANOCOBALAMIN TAB 2.5-25-2 MG 1 TABLET: 2.5-25-2 TAB at 09:07

## 2018-01-01 RX ADMIN — SODIUM CHLORIDE: 0.9 INJECTION, SOLUTION INTRAVENOUS at 11:43

## 2018-01-01 RX ADMIN — RANOLAZINE 500 MG: 500 TABLET, FILM COATED, EXTENDED RELEASE ORAL at 21:51

## 2018-01-01 RX ADMIN — FOLIC ACID-PYRIDOXINE-CYANOCOBALAMIN TAB 2.5-25-2 MG 1 TABLET: 2.5-25-2 TAB at 08:42

## 2018-01-01 RX ADMIN — DIVALPROEX SODIUM 500 MG: 500 TABLET, DELAYED RELEASE ORAL at 08:42

## 2018-01-01 RX ADMIN — ACETAMINOPHEN 650 MG: 325 TABLET, FILM COATED ORAL at 17:40

## 2018-01-01 RX ADMIN — FLUOXETINE 20 MG: 20 CAPSULE ORAL at 09:07

## 2018-01-01 RX ADMIN — HEPARIN SODIUM 5000 UNITS: 5000 INJECTION, SOLUTION INTRAVENOUS; SUBCUTANEOUS at 21:51

## 2018-01-01 RX ADMIN — PREDNISONE 5 MG: 5 TABLET ORAL at 09:08

## 2018-01-01 RX ADMIN — DIVALPROEX SODIUM 500 MG: 500 TABLET, DELAYED RELEASE ORAL at 09:32

## 2018-01-01 RX ADMIN — RANOLAZINE 500 MG: 500 TABLET, FILM COATED, EXTENDED RELEASE ORAL at 08:41

## 2018-01-01 RX ADMIN — Medication 1 TABLET: at 08:42

## 2018-01-01 RX ADMIN — FLUOXETINE 20 MG: 20 CAPSULE ORAL at 08:42

## 2018-01-01 RX ADMIN — TACROLIMUS 1 MG: 1 CAPSULE ORAL at 17:12

## 2018-01-09 NOTE — RESULT NOTES
Message   Recorded as Task   Date: 12/28/2016 08:53 AM, Created By: Keshia Walker   Task Name: Follow Up   Assigned To: [id number] end procedure,Team   Regarding Patient: Caitlyn Escamilla, Status: Active   CommentCorey Linda - 28 Dec 2016 8:53 AM     TASK CREATED  Pt  is S/P RT PIRIFORMIS INJ on 12/21 by Dr Russ Rutledge  F/U is PRN  Steven Hamlin - 28 Dec 2016 12:57 PM     TASK EDITED  pT  REPORTS 50% RELIEF POST INJ ,WITH NO S/SOF INF  F/U is PRN     Sergey Proper - 87 Dec 2016 1:35 PM     TASK REPLIED TO: Previously Assigned To [place] Proper                      aware agree        Signatures   Electronically signed by : Jus Mandujano, ; Dec 28 2016  3:27PM EST                       (Author)

## 2018-01-09 NOTE — RESULT NOTES
Message   Recorded as Task   Date: 03/07/2017 02:14 PM, Created By: Karla Ozuna   Task Name: Follow Up   Assigned To: [id number] [address] clinical,Team   Regarding Patient: Harjit Marley, Status: Active   CommentIngrid Chuy - 07 Mar 2017 2:14 PM     TASK CREATED  Pt  is S/P RT GTB INJ,USGI ON3/01 by Yuliana Rodriguez Feeling  F/U is 4/05 w/AO  Kristy Montero - 08 Mar 2017 11:10 AM     TASK EDITED  MSG LEFT H# FOR PT TO tSeven Baig - 10 Mar 2017 3:28 PM     TASK EDITED  Pt  reports no relief post injection  Pt  pain is awful when she wakes up, but when she takes Tylenol in the morning the pain goes away  F/U is on 4/05 w/AO     Elvie Fernandez - 13 Mar 2017 12:16 PM     TASK EDITED  aware agree        Signatures   Electronically signed by : Kianna Tesfaye, ; Mar 13 2017 12:28PM EST                       (Author)

## 2018-01-10 NOTE — RESULT NOTES
Verified Results  * MRI BRAIN W WO CONTRAST [id number] 01:02PM [facility]     Test Name Result Flag Reference   MRI BRAIN W WO CONTRAST (Report)     MRI BRAIN WITH AND WITHOUT CONTRAST     INDICATION: Episodic headaches since December     COMPARISON: None  TECHNIQUE:   Sagittal T1, axial T2, axial FLAIR, axial T1, axial gradient imaging, axial diffusion  Sagittal, axial and coronal T1 postcontrast       9 mL of Gadavist was injected intravenously without immediate consequence  IMAGE QUALITY:  Diagnostic  FINDINGS:     BRAIN PARENCHYMA:    Numerous tiny T2 and FLAIR hyperintense lesions are present within the subcortical and deep white matter of the frontal and parietal lobes bilaterally    Although nonspecific, these type findings are suspicious for mild chronic microvascular changes    including the type which may be associated with migraine headaches  Clinical correlation recommended since other etiologies including MS or Lyme disease could appear similarly  There is no discrete mass, mass effect or midline shift  Brainstem and cerebellum demonstrate normal signal  There is no intracranial hemorrhage  There is no evidence of acute infarction and diffusion imaging is unremarkable  VENTRICLES: Normal      POSTCONTRAST IMAGING: Postcontrast imaging of the brain demonstrates no abnormal enhancement  SELLA AND PITUITARY GLAND:    Normal appearance     ORBITS: Normal      PARANASAL SINUSES: Normal      VASCULATURE: Evaluation of the major intracranial vasculature demonstrates appropriate flow voids  CALVARIUM AND SKULL BASE: Normal      EXTRACRANIAL SOFT TISSUES: Normal        IMPRESSION:   Numerous tiny nonspecific supratentorial T2 and FLAIR hyperintense foci suspicious for mild chronic microvascular changes which may be associated with migraine headaches  Clinical correlation recommended since other etiologies including MS or Lyme    disease could appear similarly  Signed by:   Shaheed Kuo MD   1/15/16

## 2018-01-10 NOTE — RESULT NOTES
Verified Results  * MAMMO SCREENING BILATERAL W CAD [id number] 10:32AM Chelsey Warner Order Number: [id number]    - Patient Instructions: To schedule this appointment, please contact Central Scheduling at [phone]  Do not wear any perfume, powder, lotion or deodorant on breast or underarm area  Please bring your doctors order, referral (if needed) and insurance information with you on the day of the test  Failure to bring this information may result in this test being rescheduled  Arrive 15 minutes prior to your appointment time to register  On the day of your test, please bring any prior mammogram or breast studies with you that were not performed at a [facility]  Failure to bring prior exams may result in your test needing to be rescheduled  Test Name Result Flag Reference   MAMMO SCREENING BILATERAL W CAD (Report)     Patient History:   Patient is postmenopausal    Family history of breast cancer at age 48 or over in paternal    aunt, unknown cancer in brother, unknown cancer at age 68 in    father  Benign excisional biopsy of the right breast, August 22, 2006  Took hormonal contraceptives for 21 years  Patient is an every day smoker  Patient's BMI is 35 4  Reason for exam: screening, asymptomatic  Mammo Screening Bilateral W CAD: November 20, 2017 - Check In #:    [de-identified]   Bilateral CC and MLO view(s) were taken  Technologist: CHAU Trinidad (CHAU)(M)   Prior study comparison: September 2, 2016, mammo screening    bilateral W CAD, performed at [address] [place] [address]  July 10, 2015, bilateral AMA dig scrn mamm w/CAD    performed at [place] [address]  May 20,    2014, right breast unilateral diagnostic mammogram, performed at    [address] [place] [address]  May 7, 2014, bilateral    digital screening mammogram, performed at [address] [place] [address]   May 6, 2013, bilateral digital screening   mammogram, performed at [place] [address]  May 4, 2012, bilateral digital screening mammogram,    performed at [place] [address]  May 3,    2011, bilateral digital screening mammogram, performed at [place] [address]  The breast tissue is almost entirely fat  Bilateral digital mammography was performed  No dominant soft    tissue mass, architectural distortion or suspicious    calcifications are noted in either breast  The skin and nipple    contours are within normal limits  No evidence of malignancy  No significant changes when compared with prior studies  ACR BI-RADSï¾® Assessments: BiRad:1 - Negative     Recommendation:   Routine screening mammogram of both breasts in 1 year  Analyzed by CAD     The patient is scheduled in a reminder system for screening    mammography  8-10% of cancers will be missed on mammography  Management of a    palpable abnormality must be based on clinical grounds  Patients   will be notified of their results via letter from our facility  Accredited by [employer] and FDA       Transcription Location: [address] [place] [address]: [id number]     Risk Value(s):   Tyrer-Cuzick 10 Year: 4 200%, Tyrer-Cuzick Lifetime: 7 100%,    Myriad Table: 1 5%, ADALBERTO 5 Year: 2 0%, NCI Lifetime: 6 0%   Signed by:   Krystyna Chavez MD   11/21/17

## 2018-01-10 NOTE — RESULT NOTES
Verified Results  * MAMMO SCREENING BILATERAL W CAD [id number] 02:53PM Geetha Raphael Order Number: [id number]     Test Name Result Flag Reference   MAMMO SCREENING BILATERAL W CAD (Report)     Patient History:   Patient is postmenopausal    Family history of unknown cancer in father at age 68, breast    cancer in paternal aunt at age 48 or over, and unknown cancer in    brother  Benign excisional biopsy of the right breast, August 22, 2006  Took hormonal contraceptives for 21 years  Patient is an every day smoker  Patient's BMI is 35 4  Reason for exam: screening (asymptomatic)  Screening     Mammo Screening Bilateral W CAD: September 2, 2016 - Check In #:    [de-identified]   Bilateral CC and MLO view(s) were taken  Technologist: Saqib Sapp, RT(R)(M)   Prior study comparison: July 10, 2015, bilateral AMA dig scrn    mamm w/CAD, performed at [facility]  May 20, 2014, right breast unilateral diagnostic mammogram,    performed at [address] [place] [address]  May 7, 2014,    bilateral digital screening mammogram performed at [address]  May 6, 2013, bilateral digital    scre[facility] mamm[facility] [place] [facility]  May 4, 2012, bilateral digital screening    [place] [address] [facility]  The breast tissue is almost entirely fat  Bilateral digital    mammography is performed  No dominant soft tissue mass,    architectural distortion or suspicious calcifications are noted  The skin and nipple contours are within normal limits  Right    breast postsurgical scarring is unchanged  No evidence of    malignancy  No significant changes when compared to prior    studies  1  No evidence of malignancy  2  No significant change when compared with the prior study       ASSESSMENT: BiRad:2 - Benign     Recommendation:   Routine screening mammogram of both breasts in 1 year  A reminder letter will be scheduled   Analyzed by CAD     8-10% of cancers will be missed on mammography  Management of a    palpable abnormality must be based on clinical grounds  Patients   will be notified of their results via letter from our facility  Accredited by [employer] and FDA       Transcription Location: CHAU Molina [facility]: [id number]     Risk Value(s):   Tyrer-Cuzick 10 Year: 4 179%, Tyrer-Cuzick Lifetime: 7 510%,    Myriad Table: 1 5%, ADALBERTO 5 Year: 1 9%, NCI Lifetime: 6 3%   Signed by:   Harjit Ochoa MD   9/2/16

## 2018-01-11 NOTE — RESULT NOTES
Message   Recorded as Task   Date: 08/31/2016 09:50 AM, Created By: Silvano Iglesias   Task Name: Follow Up   Assigned To: [id number] end procedure,Team   Regarding Patient: Cintia Gomez, Status: Active   CommentLauren Hides - 31 Aug 2016 9:50 AM     TASK CREATED  Pt is S/P REPEAT RT SIJ INJ on 8/24 by Dr Ashley Back  F/U is on 9/06   Silvano Iglesias - 31 Aug 2016 11:06 AM     TASK EDITED   Pt reports no relief post inj , with no S/S of infection and no increase in ability to perform ADL's  Pt  has POVS on 9/06     Jose Castrejon - 31 Aug 2016 11:19 AM     TASK REPLIED TO: Previously Assigned To oJse Castrejon                      aware agree with plan        Signatures   Electronically signed by : David Landrum, ; Aug 31 2016 12:44PM EST                       (Author)

## 2018-01-11 NOTE — RESULT NOTES
Message   Recorded as Task   Date: 08/08/2016 01:01 PM, Created By: Pattie Fontana   Task Name: Follow Up   Assigned To: Swati Guerrero   Regarding Patient: MERCEDES NORIEGA, Status: In Progress   Rod Mayfield - 08 Aug 2016 1:01 PM     TASK CREATED  Caller: Self; General Medical Question; [phone] (Home)  Received call from pt  Pt s/p RT SIJ INJ performed on 7/27  Pt reports no s/s of infection or adverse affect  Pt states that she experienced no relief from this injection  Pt reports current pain to be a 10/10  Pt states tylenol and heat help to relieve the pain temporarily, but does not last very long  Pt requesting another injection  Please provide you recommendtations   Anmol Keith - 08 Aug 2016 3:27 PM     TASK REPLIED TO: Previously Assigned To [address] [place] [address] clinical,Team                      aware, ok to repeat at 2 week jacque to see if we can get better relief second time   Pattie Fontana - 08 Aug 2016 3:59 PM     TASK EDITED  Please contact pt to schedule repeat injection  Swati Guerrero - 08 Aug 2016 4:06 PM     TASK IN PROGRESS   Swati Guerrero - 08 Aug 2016 4:11 PM     TASK EDITED  Called pt sched RPT RT SIJ INJ on 08 24 2016 @ 9:15am for 9:30am; verbal inst given and aware of  needed  However, pt would like to come on Wednesday, if  at all possible; you have a LT GTB THIS WEDNESDAY LAST PROC OF AM  Elidia Lightning Elidia Lightning Let me know    Elidia Lightning Elidia Lightning Oscar Huynh - 09 Aug 2016 7:03 AM     TASK REPLIED TO: Previously Assigned To Thiago Lang you can double book with GTB thanks   [facility] - 10 Aug 2016 11:41 AM     TASK EDITED  I called pt to apologize on getting back to her at this time, being that it was little overwhelming yesterday, pt understood said no worries  Elidia Lightning Elidia Lightning Elidia Lightning Can we put her on for Monday 08 22 2016 sometime in the afternoon?  let me know  Elidia Lightning Elidia Lightning Elidia Lightning Herb Keith - 10 Aug 2016 2:16 PM     TASK REPLIED TO: Previously Assigned To Anmol Keith wed 8/24 at 2:15        Signatures   Electronically signed by : Lianet Rosado, ; Aug 11 2016 11:54AM EST                       (Author)

## 2018-01-11 NOTE — MISCELLANEOUS
Message   Date: 22 Feb 2017 3:18 PM EST, Recorded By: Mari Durham For: [address] end clinical,Team   Caller: [facility] pharmacy , Pharmacist   Reason: Care Coordination   Received tx'd call from Rashmi Ingram at   Caller is [facility] in [employer] calling about an medication interaction  S/w Ericka Garsia from [facility] who states that pt's Tramadol prescribed by Dr Ruth Retana is interacting w/ Fluoxetine, Duloxetine, and Clonazepam  Per Ericka Garsia, Fluoxetine and Duloxetine could cause Serotonin Syndrome when combined w/ Tramadol  Per Ericka Eaton, Clonazepam could cause increased drowsiness when combined w/ Tramadol  S/w Dr Ruth Retana and advised of the same  Per  Dr Ruth Retana, he is aware, and it is okay to fill at this time  S/w Ericka Garsia and advised of the same  Ericka Garsia verbalized understanding and was appreciative  Active Problems    1  Abdominal aortic aneurysm, without rupture (441 4) (I71 4)   2  Arteriosclerosis of coronary artery (414 00) (I25 10)   3  Atherosclerotic PVD with intermittent claudication (440 21) (I73 9)   4  Benign essential hypertension (401 1) (I10)   5  Calcification of aorta (440 0) (I70 0)   6  Carotid stenosis, asymptomatic, bilateral (433 10,433 30) (I65 23)   7  Chronic obstructive pulmonary disease (496) (J44 9)   8  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)   9  Convulsive disorder (780 39) (R56 9)   10  Current smoker (305 1) (F17 200)   11  Depression (311) (F32 9)   12  GERD without esophagitis (530 81) (K21 9)   13  Greater trochanteric bursitis of right hip (726 5) (M70 61)   14  Headache, migraine (346 90) (G43 909)   15  Hyperlipidemia (272 4) (E78 5)   16  Medicare annual wellness visit, subsequent (V70 0) (Z00 00)   17  On anticoagulant therapy (V58 61) (Z79 01)   18  Peripheral arterial disease (443 9) (I73 9)   19  Peripheral neuropathy (356 9) (G62 9)   20  Piriformis syndrome, right (355 0) (G57 01)   21  Sacroiliitis (720 2) (M46 1)   22   Status post renal autotransplantation (V42 0) (Z94 0)   23  Stenosis of left carotid artery (433 10) (I65 22)   24  Type 2 diabetes mellitus (250 00) (E11 9)    Current Meds   1  Acetaminophen 500 MG Oral Tablet; Take 2 caplets every 6 hours as needed  Donot   take more than 6 caplets in 24 hours; Therapy: 82Kgs3913 to (Last Rx:18Apr2016) Ordered   2  Aspirin 81 MG TABS Recorded   3  Bumetanide 1 MG Oral Tablet; Take 1 tablet daily; Therapy: 64WHO2563 to (Evaluate:99Uvd3432)  Requested for: 67RGU0503; Last   Rx:75Cqw1984 Ordered   4  ClonazePAM 0 5 MG Oral Tablet (KlonoPIN); TAKE 1 TABLET AT BEDTIME; Last   Rx:06Jan2017 Ordered   5  Clopidogrel Bisulfate 75 MG Oral Tablet (Plavix); Take 1 tablet daily; Therapy: 10Oru5263 to (Antoinette Arreolastein)  Requested for: 95AAY0271; Last   Rx:06Jan2017 Ordered   6  Divalproex Sodium 500 MG Oral Tablet Delayed Release (Depakote); take 1 tablet twice   a day; Therapy: 25KLJ0777 to (Antoinette Jl)  Requested for: 24QJF1581; Last   Rx:06Jan2017 Ordered   7  DULoxetine HCl - 30 MG Oral Capsule Delayed Release Particles (Cymbalta); take 1   capsule daily; Therapy: 98CLN2833 to (Antoinette Jl)  Requested for: 07KOO1535; Last   Rx:06Jan2017 Ordered   8  Famotidine 40 MG Oral Tablet; take 1 tablet twice a day; Therapy: 43OYA7045 to (Antoinette Jl)  Requested for: 22FKZ3368; Last   Rx:06Jan2017 Ordered   9  Fioricet -40 MG Oral Capsule (Butalbital-APAP-Caffeine); TAKE 1 CAPSULE   Every 6 hours; Therapy: 59SQX8232 to (Evaluate:27Jan2016)  Requested for: 97BMI8631; Last   Rx:19Jan2016 Ordered   10  FLUoxetine HCl - 20 MG Oral Capsule (PROzac); take 1 capsule daily; Therapy: 21Jan2013 to (Antoinette Arreolastein)  Requested for: 23PYP9903; Last    Rx:06Jan2017 Ordered   11  Isosorbide Mononitrate ER 30 MG Oral Tablet Extended Release 24 Hour; Take 1 tablet    daily; Therapy: 10HJF2660 to (Antoinette Parikh)  Requested for: 60AXB2520; Last    Rx:06Jan2017 Ordered   12  Janumet  MG Oral Tablet; take 1 tablet twice a day; Therapy: 44MQL6495 to (Win Roman)  Requested for: 98BAM0151; Last    Rx:06Jan2017 Ordered   15  Lidocaine 5 % External Ointment; APPLY TO AFFECTED AREA FOR BACK PAIN 3    TIMES  A DAY; Therapy: 25TKN1847 to (Evaluate:48Hhq4023)  Requested for: 35YOB1733; Last    SD:56HYB5709 Ordered   14  Magnesium Oxide 500 MG Oral Tablet; Take 1 tablet daily  Requested for: 58HWC7365;    Last Rx:05Oct2012 Ordered   15  Metoprolol Succinate ER 25 MG Oral Tablet Extended Release 24 Hour; take 1 tablet    twice a day; Therapy: 64DPD1409 to (Win Roman)  Requested for: 35XLT0244; Last    Rx:06Jan2017 Ordered   12  Mycophenolate Sodium 360 MG Oral Tablet Delayed Release (Myfortic); take 1 tablet    twice a day; Therapy: 34RMY4044 to (Win Roman)  Requested for: 32XQO8095; Last    Rx:06Jan2017 Ordered   17  [place]-3 Fish Oil 1200 MG Oral Capsule; Take 1 capsule twice daily  Requested for:    18UMX3886; Last Rx:05Oct2012 Ordered   18  Omeprazole 20 MG Oral Capsule Delayed Release; TAKE 1 CAPSULE EVERY          MORNING BEFORE BREAKFAST; Therapy: 84IBA1911 to (Evaluate:47Smr3755)  Requested for: 66KZM5284; Last    Rx:13Nov2016 Ordered   19  Plavix 75 MG Oral Tablet (Clopidogrel Bisulfate); Therapy: (Maris Stark) to Recorded   20  Ranexa 500 MG Oral Tablet Extended Release 12 Hour; Take 1 tablet twice daily     Requested for: 57Pxi7913; Last Rx:30Cxm8444; Status: ACTIVE - Renewal Denied    Ordered   21  Simvastatin 40 MG Oral Tablet; TAKE 1 TABLET DAILY AS     DIRECTED; Therapy: 67YUP7340 to (Win Roman)  Requested for: 97WJX5609; Last    Rx:06Jan2017 Ordered   25  Tacrolimus 1 MG Oral Capsule; TAKE 2 CAPSULE Twice daily; Therapy: (Recorded:08Mbp8170) to Recorded   23  TiZANidine HCl - 4 MG Oral Tablet; take 1 tablet bedtime prn muscle spasm;     Therapy: 99HYC0466 to (Evaluate:07Agb4235)  Requested for: 05AMA5650; Last Rx: 48MNX1486 Ordered   24  TraMADol HCl - 50 MG Oral Tablet; TAKE 1 TABLET Twice daily PRN; Therapy: 04KXV1722 to (Evaluate:08Mar2017); Last Rx:69Jrs0552 Ordered    Allergies    1   No Known Drug Allergies    Signatures   Electronically signed by : Latonia Martinez RN; Feb 22 2017  3:24PM EST                       (Author)

## 2018-01-11 NOTE — RESULT NOTES
Verified Results  [address] [id number] 09:17AM Geovanna Grit Order Number: [id number]     Test Name Result Flag Reference   US ABDOMINAL AORTA (Report)     ABDOMINAL AORTIC ULTRASOUND     INDICATION: Evaluate for aortic aneurysm  COMPARISON: 73-year-old female with low back pain and suspected calcified saccular aortic aneurysm described on lumbar spine series 4/25/2016  FINDINGS:      Ultrasound of the abdominal aorta was performed in longitudinal and transverse planes with a curvilinear transducer  Proximal aorta: 1 8 cm   Mid aorta:  2 4 cm   Distal aorta:  3 1 cm   Right common iliac origin: 1 2 cm   Left common iliac origin: 1 2 cm     There is extensive calcified plaque throughout the aorta which obscures portions of the distal aorta including the suspected saccular aneurysm  Therefore, CT of the abdomen and pelvis is recommended  No periaortic collections or adenopathy detected  IMPRESSION:     Heavily calcified aorta which is prominent distally measuring 3 1 cm  The suspected saccular aneurysm however may be obscured by the calcification for which CT is recommended         Workstation performed: [id number]     Signed by:   Jagdeep Pinzon MD   4/29/16

## 2018-01-12 NOTE — RESULT NOTES
Verified Results  (Q) HEMOGLOBIN A1c 77Kkx0456 10:30AM Serge Deng   REPORT COMMENT:  PT [id number]  FASTING:YES AN UPDATE OR CORRECTION HAS BEEN MADE TO NAME     Test Name Result Flag Reference   HEMOGLOBIN A1c 6 1 % of total Hgb H <5 7   For someone without known diabetes, a hemoglobin   A1c value between 5 7% and 6 4% is consistent with  prediabetes and should be confirmed with a   follow-up test      For someone with known diabetes, a value <7%  indicates that their diabetes is well controlled  A1c  targets should be individualized based on duration of  diabetes, age, comorbid conditions, and other  considerations  This assay result is consistent with an increased risk  of diabetes  Currently, no consensus exists regarding use of  hemoglobin A1c for diagnosis of diabetes for children

## 2018-01-12 NOTE — MISCELLANEOUS
Message   Recorded as Task   Date: 04/07/2017 02:40 PM, Created By: Lokesh Pineda   Task Name: Medical Record Request   Assigned To: Carlos1 N Mary Willams ,Team   Regarding Patient: Nikhil Lau, Status: Active   Comment:    Shobha Denton - 07 Apr 2017 2:40 PM     TASK CREATED  Caller: Self; Medical Record Request; [phone] (Home)  Returned patient's call, unable to reach her  Due to non-par insurance, patient's future appointments have been cancelled  She is requesting (via voicemail) that her records be forwarded to Dr Stanislaw Cox at [place] [address]  Fax # [phone]  Faxed over all office notes & procedure notes to Dr La Nena Zapata ~ [place] [address], Fax # [phone] this morning, Tuesday 04/11/17  Active Problems    1  Abdominal aortic aneurysm, without rupture (441 4) (I71 4)   2  Arteriosclerosis of coronary artery (414 00) (I25 10)   3  Atherosclerotic PVD with intermittent claudication (440 21) (I73 9)   4  Benign essential hypertension (401 1) (I10)   5  Calcification of aorta (440 0) (I70 0)   6  Carotid stenosis, asymptomatic, bilateral (433 10,433 30) (I65 23)   7  Chronic obstructive pulmonary disease (496) (J44 9)   8  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)   9  Convulsive disorder (780 39) (R56 9)   10  Current smoker (305 1) (F17 200)   11  Depression (311) (F32 9)   12  GERD without esophagitis (530 81) (K21 9)   13  Greater trochanteric bursitis of right hip (726 5) (M70 61)   14  Headache, migraine (346 90) (G43 909)   15  Hyperlipidemia (272 4) (E78 5)   16  Medicare annual wellness visit, subsequent (V70 0) (Z00 00)   17  On anticoagulant therapy (V58 61) (Z79 01)   18  Peripheral arterial disease (443 9) (I73 9)   19  Peripheral neuropathy (356 9) (G62 9)   20  Piriformis muscle pain (729 1) (M79 1)   21  Piriformis syndrome, right (355 0) (G57 01)   22  Sacroiliitis (720 2) (M46 1)   23  Status post renal autotransplantation (V42 0) (Z94 0)   24  Stenosis of left carotid artery (433 10) (I65 22)   25   Type 2 diabetes mellitus (250 00) (E11 9)    Current Meds   1  Acetaminophen 500 MG Oral Tablet; Take 2 caplets every 6 hours as needed  Donot   take more than 6 caplets in 24 hours; Therapy: 92Pfa0865 to (Last Rx:18Apr2016) Ordered   2  Aspirin 81 MG TABS Recorded   3  Bumetanide 1 MG Oral Tablet; Take 1 tablet daily; Therapy: 21XTQ5661 to (Evaluate:20Sep2017)  Requested for: 96UPR8601; Last   Rx:24Mar2017 Ordered   4  ClonazePAM 0 5 MG Oral Tablet (KlonoPIN); TAKE 1 TABLET AT BEDTIME; Last   Rx:06Jan2017 Ordered   5  Clopidogrel Bisulfate 75 MG Oral Tablet (Plavix); Take 1 tablet daily; Therapy: 90Rio8993 to (26 100188)  Requested for: 96RAS1994; Last   Rx:06Jan2017 Ordered   6  Divalproex Sodium 500 MG Oral Tablet Delayed Release (Depakote); take 1 tablet twice   a day; Therapy: 07FSO4478 to (26 100188)  Requested for: 43LIT5994; Last   Rx:06Jan2017 Ordered   7  DULoxetine HCl - 30 MG Oral Capsule Delayed Release Particles (Cymbalta); take 1   capsule daily; Therapy: 47OHF1973 to (26 100188)  Requested for: 35PFS8938; Last   Rx:06Jan2017 Ordered   8  Famotidine 40 MG Oral Tablet; take 1 tablet twice a day; Therapy: 45SIJ5180 to (26 100188)  Requested for: 13NHG9294; Last   Rx:06Jan2017 Ordered   9  Fioricet -40 MG Oral Capsule (Butalbital-APAP-Caffeine); TAKE 1 CAPSULE   Every 6 hours; Therapy: 91JPJ2528 to (Evaluate:27Jan2016)  Requested for: 89ZBQ2515; Last   Rx:19Jan2016 Ordered   10  FLUoxetine HCl - 20 MG Oral Capsule (PROzac); take 1 capsule daily; Therapy: 21Jan2013 to (26 100188)  Requested for: 28MKF5533; Last    Rx:06Jan2017 Ordered   11  Isosorbide Mononitrate ER 30 MG Oral Tablet Extended Release 24 Hour; Take 1 tablet    daily; Therapy: 21WXM9145 to (Evaluate:19Umf0816)  Requested for: 35DJD1761; Last    Rx:06Jan2017 Ordered   12  Janumet  MG Oral Tablet; take 1 tablet twice a day;     Therapy: 43IJP5736 to (Evaluate:21Npr1370)  Requested for: 73XOI2998; Last    FH:01PEP6148 Ordered   13  Lidocaine 5 % External Ointment; APPLY TO AFFECTED AREA FOR BACK PAIN 3    TIMES  A DAY; Therapy: 13EWZ6405 to (Evaluate:80Oxz2753)  Requested for: 99GQO7551; Last    DK:40RIV4302 Ordered   14  Magnesium Oxide 500 MG Oral Tablet; Take 1 tablet daily  Requested for: 64DDW5934;    Last Rx:05Oct2012 Ordered   15  Metoprolol Succinate ER 25 MG Oral Tablet Extended Release 24 Hour; take 1 tablet    twice a day; Therapy: 16RSK5375 to ([facility])  Requested for: 11FPU0493; Last    Rx:06Jan2017 Ordered   12  Mycophenolate Sodium 360 MG Oral Tablet Delayed Release (Myfortic); take 1 tablet    twice a day; Therapy: 08CEW2092 to ([facility])  Requested for: 28SQM1592; Last    Rx:06Jan2017 Ordered   17  [place]-3 Fish Oil 1200 MG Oral Capsule; Take 1 capsule twice daily  Requested for:    78UFO8575; Last Rx:05Oct2012 Ordered   18  Omeprazole 20 MG Oral Capsule Delayed Release; TAKE 1 CAPSULE EVERY          MORNING BEFORE BREAKFAST; Therapy: 10YWS4626 to (Evaluate:41Fqw9064)  Requested for: 23ZLW9849; Last    Rx:09Ccr2910 Ordered   19  Plavix 75 MG Oral Tablet (Clopidogrel Bisulfate); Therapy: (Erwinol Cynthia) to Recorded   20  Ranexa 500 MG Oral Tablet Extended Release 12 Hour; Take 1 tablet twice daily     Requested for: 31Xxn8304; Last Rx:73Umm3407; Status: ACTIVE - Renewal Denied    Ordered   21  Simvastatin 40 MG Oral Tablet; TAKE 1 TABLET DAILY AS     DIRECTED; Therapy: 50OZC0580 to ([facility])  Requested for: 33OKD3652; Last    Rx:06Jan2017 Ordered   25  Tacrolimus 1 MG Oral Capsule; TAKE 2 CAPSULE Twice daily; Therapy: (Recorded:88Eju3675) to Recorded   23  TiZANidine HCl - 4 MG Oral Tablet; take 1 tablet bedtime prn muscle spasm; Therapy: 12VHA2754 to (Evaluate:77Xsf0404)  Requested for: 47LXC7909; Last    Rx:60Xio1994 Ordered    Allergies    1   No Known Drug Allergies    Signatures   Electronically signed by : Carlos Duque Alan Cobb, ; Apr 11 2017 11:54AM EST                       (Author)

## 2018-01-12 NOTE — PROGRESS NOTES
Assessment    1  Migraine (346 90) (G43 909)   2  Stenosis of left carotid artery (433 10) (I65 22)    Plan  Migraine    · Fioricet -40 MG Oral Capsule (Butalbital-APAP-Caffeine); TAKE 1 CAPSULE  Every 6 hours   · Call [phone] if: The symptoms seem worse ; Status:Complete;   Done:  09ZVM6828   · Call [phone] if: You get a headache that does not go away with your usual  treatment ; Status:Complete;   Done: 83YUM6635   · Call [phone] if: You have frequent headaches ; Status:Complete;   Done:  50OIB7431   · Call [phone] if: Your headache is not better in 1 week ; Status:Complete;   Done:  93OIJ0079   · Call [phone] if: Your headaches lead to vomiting ; Status:Complete;   Done:  48LLS7868   · Call [phone] if: Your headaches occur mostly first thing in the morning ;  Status:Complete;   Done: 16ZIM9678   · Call 911 if: You develop double vision (see two of everything) ; Status:Complete;   Done:  51UHT3393   · Call 911 if: You have any symptoms of a stroke ; Status:Complete;   Done: 69WRZ3233   · Seek Immediate Medical Attention if: You lose your vision for a short period of time ;  Status:Complete;   Done: 31RMP6751  Stenosis of left carotid artery    · Vascular Surgery Follow Up Evaluation and Treatment  Follow-up  Status: Hold For -  Scheduling  Requested for: 22OXI4011    Chief Complaint  FOLLOW UP HEADACHES  History of Present Illness  Patient is here for followup of headaches and to review tests Patient completed ther steroid taper patient has not had any headache since her last visit here Patient's MRI and carotids were reviewed Patient is still smoking 1/2 ppd Paitent has appt with vascular tomorrow Patient is sleeping better also Patient headaches when they happened were sudden onset b/l temples and were relieved by nitro       The patient is being seen for follow-up of migraine headache  The patient reports doing well  The patient is currently asymptomatic  Associated symptoms: no nasal congestion, no facial pain, no neck pain, no vertigo and no lightheadedness  Disease management:  the patient is doing well with her goals  Review of Systems    Constitutional: no fever, not feeling poorly, no chills and not feeling tired  Cardiovascular: no chest pain and no palpitations  Musculoskeletal: no arthralgias and no myalgias  Neurological: as noted in HPI  Active Problems    1  Arteriosclerosis of coronary artery (414 00) (I25 10)   2  Benign essential hypertension (401 1) (I10)   3  Chronic obstructive pulmonary disease (496) (J44 9)   4  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)   5  Convulsive disorder (780 39) (R56 9)   6  Depression (311) (F32 9)   7  GERD without esophagitis (530 81) (K21 9)   8  Hyperlipidemia (272 4) (E78 5)   9  Low back pain (724 2) (M54 5)   10  Peripheral neuropathy (356 9) (G62 9)   11  Sciatica of right side (724 3) (M54 31)   12  Status post renal autotransplantation (V42 0) (Z94 0)   13  Stenosis of left carotid artery (433 10) (I65 22)   14  Type 2 diabetes mellitus (250 00) (E11 9)    Past Medical History    1  History of Complications, kidney transplant (996 81) (T86 10)   2  History of Coronary arteriosclerosis in native artery (414 01) (I25 10)   3  History of Diabetes Mellitus (250 00)   4  History of Encounter for routine gynecological examination (V72 31) (Z01 419)   5  History of Encounter for screening mammogram for malignant neoplasm of breast   (V76 12) (Z12 31)   6  History of cardiac disorder (V12 50) (Z86 79)   7  History of cataract (V12 49) (Z86 69)   8  History of hypertension (V12 59) (Z86 79)   9  History of insomnia (V13 89) (Z87 898)   10  History of nicotine dependence (V15 82) (Z87 891)   11  History of Medicare annual wellness visit, initial (V70 0) (Z00 00)   12  History of Perforation of left tympanic membrane (384 20) (H72 92)   13  History of Preop examination (V72 84) (Z01 818)   14   History of Screening for osteoporosis (V82 81) (Z13 820)   15  History of Shoulder joint pain, unspecified laterality (719 41) (M25 519)   16  History of Status post renal autotransplantation (V42 0) (Z94 0)   17  History of Vaginal candidiasis (112 1) (B37 3)    The active problems and past medical history were reviewed and updated today  Surgical History    1  History of Biopsy Breast Open   2  History of CABG   3  History of Cardiac Cath Procedure Outcome: Successful   4  History of Cath Stent 1 Initial   5  History of EMG Dynamic Surface, 1-12 Muscles   6  History of PTCA   7  History of Renal Transplant    Family History    1  Family history of Hypertension (V17 49)    2  Family history of Coronary Artery Disease (V17 49)   3  Family history of Diabetes Mellitus (V18 0)   4  Family history of Polycystic Kidney (V18 61)    Social History    · Denied: History of Alcohol Use (History)   · Current Smoker (305 1)   · Uses Safety Equipment - Seatbelts    Current Meds   1  Aspirin 81 MG Oral Tablet Recorded   2  Bumetanide 1 MG Oral Tablet; Take 1 tablet daily; Therapy: 23RIX7629 to (Radha Romero)  Requested for: 55Vue3182; Last   Rx:16Hnn0161 Ordered   3  ClonazePAM 0 5 MG Oral Tablet; TAKE 1 TABLET AT BEDTIME; Last Rx:18Nov2015   Ordered   4  Clopidogrel Bisulfate 75 MG Oral Tablet; Take 1 tablet daily; Therapy: 69Bfo5172 to (Evaluate:59Ipc8210)  Requested for: 07Fyz1201; Last   Rx:87Mds6574 Ordered   5  Divalproex Sodium 500 MG Oral Tablet Delayed Release; take 1 tablet twice a day; Therapy: 63KZM1797 to (Evaluate:10Oct2015)  Requested for: 13ZCP4981; Last   Rx:56Ctu2315 Ordered   6  DULoxetine HCl - 30 MG Oral Capsule Delayed Release Particles; take 1 capsule daily; Therapy: 16SXZ4853 to (Isai Love)  Requested for: 32LJW4548; Last   Rx:47Imo4436 Ordered   7  Famotidine 40 MG Oral Tablet; take 1 tablet twice a day; Therapy: 43GKG4534 to (96 098880)  Requested for: 09Apr2014;  Last   Rx:09Apr2014 Ordered   8  FLUoxetine HCl - 20 MG Oral Capsule; TAKE 1 CAPSULE Daily; Therapy: 84VEH9144 to [phone]  Requested for: 74 912 574; Last   Rx:09Apr2014; Status: ACTIVE - Renewal Denied Ordered   9  Glimepiride 4 MG Oral Tablet; Take 1 tablet daily; Therapy: 12BQF7788 to (Milton Haque)  Requested for: 18KVT8431; Last   Rx:27Nov2015 Ordered   10  Isosorbide Mononitrate ER 30 MG Oral Tablet Extended Release 24 Hour; Take 1 tablet    daily; Therapy: 28BMD2251 to (Evaluate:94Ztv9364)  Requested for: 88Enz8146; Last    Rx:92Ail6114 Ordered   11  Janumet  MG Oral Tablet; take 1 tablet twice a day; Therapy: 08OKU9095 to (Evaluate:09Fcm7712)  Requested for: 57CAX1618; Last    Rx:41Uof6245 Ordered   12  Magnesium Oxide 500 MG Oral Tablet; Take 1 tablet daily  Requested for: 94XYU2214;    Last Rx:05Oct2012 Ordered   13  Metoprolol Succinate ER 25 MG Oral Tablet Extended Release 24 Hour; take 1 tablet    twice a day; Therapy: 27ENQ1438 to (Noa Casey)  Requested for: 02ZUM1860; Last    Rx:60Sen9147 Ordered   14  Mycophenolic Acid 834 MG Oral Tablet Delayed Release; take 1 tablet twice a day; Therapy: 10NCP7419 to (Evaluate:10Oct2015)  Requested for: 93FGL5755; Last    Rx:74Fmo8838 Ordered   15  [place]-3 Fish Oil 1200 MG Oral Capsule; Take 1 capsule twice daily  Requested for:    85TND8810; Last Rx:05Oct2012 Ordered   16  Omeprazole 20 MG Oral Capsule Delayed Release; TAKE 1 CAPSULE EVERY          MORNING BEFORE BREAKFAST; Therapy: 68NMJ5390 to (Evaluate:74Uce5274)  Requested for: 67LRR6658; Last    Rx:27Nov2015 Ordered   17  PredniSONE 10 MG Oral Tablet; TAKE 4 TABLETS DAILY FOR 3 DAYS,3 TABLETS DAILY    FOR 3 DAYS, 2 TABLETS DAILY FOR 3 DAYS AND 1 TABLET DAILY FOR 3 DAYS,    THEN STOP; Therapy: 39BYE1281 to (Last Rx:05Jan2016)  Requested for: 55CKM6598 Ordered   18  PredniSONE 5 MG Oral Tablet; TAKE 1 TABLET DAILY AS     DIRECTED;     Therapy: 59IRF2023 to (Noa Carmela) Requested for: 47OAE3744; Last    Rx:79Gkw3963 Ordered   19  Ranexa 500 MG Oral Tablet Extended Release 12 Hour; Take 1 tablet twice daily     Requested for: 03Apr2013; Last Rx:82Uox9012; Status: ACTIVE - Renewal Denied    Ordered   20  Simvastatin 40 MG Oral Tablet; TAKE 1 TABLET DAILY AS     DIRECTED; Therapy: 57YBP2161 to (Evaluate:43Eny9456)  Requested for: 90Hwt2178; Last    Rx:54Msr1858 Ordered   21  Tacrolimus 1 MG Oral Capsule; Take 1 in the morning and 2 in the afternoon; Last    Rx:37Jsv5292 Ordered    Allergies    1  No Known Drug Allergies    Vitals  Vital Signs [Data Includes: Current Encounter]    Recorded: 74BNG5011 73:18MV   Systolic 042   Diastolic 72   Height 5 ft 3 in   Weight 197 lb    BMI Calculated 34 9   BSA Calculated 1 92     Physical Exam    Constitutional   General appearance: No acute distress, well appearing and well nourished  Eyes   Conjunctiva and lids: No swelling, erythema or discharge  Ears, Nose, Mouth, and Throat   External inspection of ears and nose: Normal     Cardiovascular   Auscultation of heart: Normal rate and rhythm, normal S1 and S2, without murmurs  Musculoskeletal   Gait and station: Normal     Neurologic   Cranial nerves: Cranial nerves 2-12 intact      Psychiatric   Orientation to person, place, and time: Normal     Mood and affect: Normal          Future Appointments    Date/Time Provider Specialty Site   01/20/2016 02:45 PM Olivia Jaimes DO Vascular Surgery [address] [facility]   04/18/2016 01:00 PM Sam Mackenzie DO Family Medicine [facility]     Signatures   Electronically signed by : Alyssa Brown DO; Jan 19 2016 12:28PM EST                       (Author)

## 2018-01-12 NOTE — MISCELLANEOUS
Message  Spoke with pt  to confirm appointments  Patient states she is ok w/ cv, tania and lev but does not feel its needed to have the aoil  I stated to cb when the tests are completed to sched an ov with Theodore and advise him she refused aoil  She was ok with that  Active Problems    1  Abdominal aortic aneurysm (441 4) (I71 4)  2  Anterolisthesis (756 12) (M43 10)  3  Arteriosclerosis of coronary artery (414 00) (I25 10)  4  Benign essential hypertension (401 1) (I10)  5  Calcification of aorta (440 0) (I70 0)  6  Chronic obstructive pulmonary disease (496) (J44 9)  7  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)  8  Convulsive disorder (780 39) (R56 9)  9  Depression (311) (F32 9)  10  Encounter for screening mammogram for breast cancer (V76 12) (Z12 31)  11  GERD without esophagitis (530 81) (K21 9)  12  Hyperlipidemia (272 4) (E78 5)  13  Low back pain (724 2) (M54 5)  14  Migraine (346 90) (G43 909)  15  On anticoagulant therapy (V58 61) (Z79 01)  16  Peripheral arterial disease (443 9) (I73 9)  17  Peripheral neuropathy (356 9) (G62 9)  18  Post-operative state (V45 89) (Z98 89)  19  Sciatica of right side (724 3) (M54 31)  20  Status post renal autotransplantation (V42 0) (Z94 0)  21  Stenosis of left carotid artery (433 10) (I65 22)  22  Swelling of left lower extremity (729 81) (M79 89)  23  Type 2 diabetes mellitus (250 00) (E11 9)    Sacroiliac joint dysfunction of right side (724 6) (M53 3)     Current Meds  1  Acetaminophen 500 MG Oral Tablet; Take 2 caplets every 6 hours as needed  Donot   take more than 6 caplets in 24 hours; Therapy: 61Tle4782 to (Last Rx:98Pzu5473) Ordered  2  Aspirin 81 MG TABS Recorded  3  Bumetanide 1 MG Oral Tablet; Take 1 tablet daily; Therapy: 35WWO8261 to (Simeon Dawkins)  Requested for: 07Zfe4916; Last   Rx:01Qak6021 Ordered  4  ClonazePAM 0 5 MG Oral Tablet; TAKE 1 TABLET AT BEDTIME; Last WV:58CXH4281   Ordered  5  Clopidogrel Bisulfate 75 MG Oral Tablet;  Take 1 tablet daily; Therapy: 25Kgf2516 to (Evaluate:63Gau0992)  Requested for: 67Qlk4500; Last   Rx:91Kcu5168 Ordered  6  Divalproex Sodium 500 MG Oral Tablet Delayed Release; take 1 tablet twice a day; Therapy: 08ROP8539 to (Evaluate:10Oct2015)  Requested for: 26NVN7916; Last   Rx:39Ghh2788 Ordered  7  DULoxetine HCl - 30 MG Oral Capsule Delayed Release Particles; take 1 capsule daily; Therapy: 38PMS3304 to (Marine Cypher)  Requested for: 90SYH5648; Last   Rx:55Hnt1983 Ordered  8  Famotidine 40 MG Oral Tablet; take 1 tablet twice a day; Therapy: 43UVO7693 to [phone])  Requested for: 09Apr2014; Last   Rx:09Apr2014 Ordered  9  Fioricet -40 MG Oral Capsule; TAKE 1 CAPSULE Every 6 hours; Therapy: 32XYJ1761 to (Evaluate:27Jan2016)  Requested for: 23ODP7470; Last   Rx:19Jan2016 Ordered  10  FLUoxetine HCl - 20 MG Oral Capsule; TAKE 1 CAPSULE Daily; Therapy: 39GTF2459 to [phone])  Requested for: 74 911 574; Last    Rx:09Apr2014; Status: ACTIVE - Renewal Denied Ordered  11  Hydrocodone-Acetaminophen 2 5-325 MG Oral Tablet; take 1 tablet every 8 hours prn    pain; Therapy: 76YQG8512 to (Evaluate:07Thg0658); Last Rx:14Jun2016 Ordered  12  Isosorbide Mononitrate ER 30 MG Oral Tablet Extended Release 24 Hour; Take 1 tablet    daily; Therapy: 00DPJ3649 to (Evaluate:69Hbf9951)  Requested for: 04Jwd0210; Last    Rx:31Dtu7841 Ordered  13  Janumet  MG Oral Tablet; TAKE 0 5 TABLET Twice daily; Therapy: 31TVP7228 to Recorded  14  Magnesium Oxide 500 MG Oral Tablet; Take 1 tablet daily  Requested for: 51KQD1740;    Last Rx:05Oct2012 Ordered  15  Metoprolol Succinate ER 25 MG Oral Tablet Extended Release 24 Hour; take 1 tablet    twice a day; Therapy: 73SAU8202 to (Evaluate:93Paa2614)  Requested for: 96Wiw2825; Last    Rx:53Brq7465 Ordered  16  Mycophenolic Acid 082 MG Oral Tablet Delayed Release; take 1 tablet twice a day;     Therapy: 05UNG9789 to (Evaluate:62Ynf3886)  Requested for: 59CEP6795; Last    Rx:16Axb9994 Ordered  17  [place]-3 Fish Oil 1200 MG Oral Capsule; Take 1 capsule twice daily  Requested for:    82DQA4174; Last Rx:65Izu1149 Ordered  18  Omeprazole 20 MG Oral Capsule Delayed Release; TAKE 1 CAPSULE EVERY          MORNING BEFORE BREAKFAST; Therapy: 46ZTD8264 to (Evaluate:86Plz4673)  Requested for: 56YLY5079; Last    Rx:64Imb5825 Ordered  19  PredniSONE 10 MG Oral Tablet; TAKE 4 TABLETS DAILY FOR 3 DAYS,3 TABLETS    DAILY FOR 3 DAYS, 2 TABLETS DAILY FOR 3 DAYS AND 1 TABLET DAILY FOR    3 DAYS, THEN STOP; Therapy: 71WLZ9165 to (Last Rx:75Efo1027)  Requested for: 55JSV0412 Ordered  20  PredniSONE 5 MG Oral Tablet; TAKE 1 TABLET DAILY AS     DIRECTED; Therapy: 50RTC7971 to (Doyne Neighbor)  Requested for: 41IWY1678; Last    Rx:44Eko1996 Ordered  21  Ranexa 500 MG Oral Tablet Extended Release 12 Hour; Take 1 tablet twice daily     Requested for: 79Aqu1971; Last Rx:02Bjv7096; Status: ACTIVE - Renewal Denied    Ordered  22  Simvastatin 40 MG Oral Tablet; TAKE 1 TABLET DAILY AS     DIRECTED; Therapy: 60GHA6488 to (Evaluate:99Pzg9741)  Requested for: 19XGB7646; Last    Rx:49Jku8847 Ordered  23  Tacrolimus 1 MG Oral Capsule; TAKE 2 CAPSULE Twice daily; Therapy: (Recorded:65Iqq4413) to Recorded    Allergies   1   No Known Drug Allergies    Signatures   Electronically signed by : Monica Christiansen, ; Jun 29 2016  9:07AM EST                       (Author)

## 2018-01-13 NOTE — RESULT NOTES
Verified Results  * XR SPINE LUMBAR MINIMUM 4 VIEWS 25Apr2016 01:44PM Konstantin Fritz Order Number: [id number]     Test Name Result Flag Reference   XR SPINE LUMBAR MINIMUM 4 VIEWS (Report)     LUMBAR SPINE     INDICATION: Low back pain, right-sided sciatica     COMPARISON: None     VIEWS: AP, lateral, bilateral oblique and coned down projections; 6 images     FINDINGS:     8 mm anterolisthesis L3 relative to L4  No acute fracture is seen  Diffuse osteophyte formation and diffuse narrowing of intervertebral disc space  Visualized soft tissues appear unremarkable  There is extensive atherosclerotic calcific dictation of the abdominal aorta and adjacent to the aorta is saccular outpouching which is partially calcified and may represent aneurysm or pseudoaneurysm  IMPRESSION:     8 mm anterolisthesis of L3 onto L4   Calcification in periaortic soft tissues which may represent partially calcified aortic aneurysm          ##sigslh##sigslh       Workstation performed: [id number]     Signed by:   Sonu Villalba MD   4/25/16       Plan  Calcification of aorta    · [address] [facility] [place]; Status:Hold For - Scheduling; Requested for:26Apr2016;   Low back pain, Sciatica of right side    · *1 - [facility] Physical Therapy  Consult  Status: Hold For - Scheduling   Requested for: 26Apr2016  Care Summary provided  : Yes

## 2018-01-13 NOTE — RESULT NOTES
Verified Results  (Q) HEMOGLOBIN A1c 78IAU9996 10:54AM Bozena Sharpon   REPORT COMMENT:  FASTING:YES     Test Name Result Flag Reference   HEMOGLOBIN A1c 5 7 % of total Hgb H <5 7   According to ADA guidelines, hemoglobin A1c <7 0%  represents optimal control in non-pregnant diabetic  patients  Different metrics may apply to specific  patient populations  Standards of Medical Care in    Diabetes Care  2013;36:s11-s66     For the purpose of screening for the presence of  diabetes  <5 7%       Consistent with the absence of diabetes  5 7-6 4%    Consistent with increased risk for diabetes              (prediabetes)  >or=6 5%    Consistent with diabetes     This assay result is consistent with an increased risk  of diabetes  Currently, no consensus exists for use of hemoglobin  A1c for diagnosis of diabetes for children

## 2018-01-14 NOTE — MISCELLANEOUS
Message   Recorded as Task   Date: 08/24/2016 11:15 AM, Created By: Herson Munoz   Task Name: Follow Up   Assigned To: [id number] [place] [address] clinical,Team   Regarding Patient: Tati NORIEGA, Status: Active   Comment:    Geneva Veliz - 24 Aug 2016 11:15 AM     TASK CREATED  At inj  appt  today 8/24, pt  states that she has an OV scheduled w/ Dr Annabelle Bone on 8/26, to f/u from first inj  Pt  is wondering if she should cx that OV and push it back a few weeks  Pt  was advised that I will s/w Dr Annabelle Bone and someone will c/b to advise her of what to do about her OV  Pt  appreciative  Please advise  Thank you  Rosendo Bishop - 24 Aug 2016 11:21 AM     TASK REPLIED TO: Previously Assigned To [id number] [place] [address] clinical,Team                      yes she can push the follow up back at least 2 weeks from today  thank you   Madison Guillen - 24 Aug 2016 11:49 AM     TASK EDITED       Pt aware & rescheduled  Active Problems    1  Abdominal aortic aneurysm (441 4) (I71 4)   2  Anterolisthesis (756 12) (M43 10)   3  Arteriosclerosis of coronary artery (414 00) (I25 10)   4  Benign essential hypertension (401 1) (I10)   5  Calcification of aorta (440 0) (I70 0)   6  Chronic obstructive pulmonary disease (496) (J44 9)   7  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)   8  Convulsive disorder (780 39) (R56 9)   9  Depression (311) (F32 9)   10  Encounter for screening mammogram for breast cancer (V76 12) (Z12 31)   11  GERD without esophagitis (530 81) (K21 9)   12  Hyperlipidemia (272 4) (E78 5)   13  Low back pain (724 2) (M54 5)   14  Migraine (346 90) (G43 909)   15  On anticoagulant therapy (V58 61) (Z79 01)   16  Peripheral arterial disease (443 9) (I73 9)   17  Peripheral neuropathy (356 9) (G62 9)   18  Sacroiliac joint dysfunction of right side (724 6) (M53 3)   19  Sacroiliitis (720 2) (M46 1)   20  Sciatica of right side (724 3) (M54 31)   21  Status post renal autotransplantation (V42 0) (Z94 0)   22   Stenosis of left carotid artery (433 10) (I65 22)   23  Type 2 diabetes mellitus (250 00) (E11 9)    Current Meds   1  Acetaminophen 500 MG Oral Tablet; Take 2 caplets every 6 hours as needed  Donot   take more than 6 caplets in 24 hours; Therapy: 70Moc7602 to (Last Rx:18Apr2016) Ordered   2  Aspirin 81 MG TABS Recorded   3  Bumetanide 1 MG Oral Tablet; Take 1 tablet daily; Therapy: 78OKB9732 to (Evaluate:13Cqf6278)  Requested for: 22Jun2016; Last   Rx:22Jun2016 Ordered   4  ClonazePAM 0 5 MG Oral Tablet (KlonoPIN); TAKE 1 TABLET AT BEDTIME; Last   GP:52SIH2475 Ordered   5  Clopidogrel Bisulfate 75 MG Oral Tablet (Plavix); Take 1 tablet daily; Therapy: 05Bni5237 to (Evaluate:14Nov2016)  Requested for: 06Mlh8195; Last   Rx:66Nna1129 Ordered   6  Divalproex Sodium 500 MG Oral Tablet Delayed Release (Depakote); take 1 tablet twice   a day; Therapy: 42PRR3521 to (Evaluate:16Jun2017)  Requested for: 08BHB9342; Last   Rx:22Jun2016 Ordered   7  DULoxetine HCl - 30 MG Oral Capsule Delayed Release Particles (Cymbalta); take 1   capsule daily; Therapy: 62YCJ1519 to (Evaluate:16Jun2017)  Requested for: 06GQZ6331; Last   Rx:22Jun2016 Ordered   8  Famotidine 40 MG Oral Tablet; take 1 tablet twice a day; Therapy: 08RWN3786 to (Evaluate:04Wnk4643)  Requested for: 41OFJ4631; Last   Rx:22Jun2016 Ordered   9  Fioricet -40 MG Oral Capsule; TAKE 1 CAPSULE Every 6 hours; Therapy: 16MDF9055 to (Evaluate:27Jan2016)  Requested for: 71OKS1603; Last   Rx:19Jan2016 Ordered   10  FLUoxetine HCl - 20 MG Oral Capsule (PROzac); TAKE 1 CAPSULE Daily; Therapy: 21Jan2013 to (Evaluate:35Wqs1199)  Requested for: 26TVI0095; Last    Rx:22Jun2016 Ordered   11  Isosorbide Mononitrate ER 30 MG Oral Tablet Extended Release 24 Hour; Take 1 tablet    daily; Therapy: 77CTX3394 to (Evaluate:36Tgg3129)  Requested for: 81Cjh9113; Last    Rx:61Tfa1304 Ordered   12  Janumet  MG Oral Tablet; TAKE 0 5 TABLET Twice daily;     Therapy: 33WRC6641 to Recorded 13  Lidocaine 5 % External Ointment; APPLY TO AFFECTED AREA FOR BACK PAIN 3    TIMES  A DAY; Therapy: 80LWN5756 to (Evaluate:43Quw0790)  Requested for: 10DWH3336; Last    QT:46HKZ4819 Ordered   14  Magnesium Oxide 500 MG Oral Tablet; Take 1 tablet daily  Requested for: 49CWV4613;    Last Rx:39Jhx3542 Ordered   15  Metoprolol Succinate ER 25 MG Oral Tablet Extended Release 24 Hour; take 1 tablet    twice a day; Therapy: 84FIX1245 to (Evaluate:40Fbe9115)  Requested for: 71Qfu3434; Last    Rx:30Wrt5833 Ordered   16  Mycophenolic Acid 167 MG Oral Tablet Delayed Release (Myfortic); take 1 tablet twice a    day; Therapy: 37GUL9891 to (Evaluate:93Yxd8901)  Requested for: 96YDN6517; Last    Rx:84Jnm4764 Ordered   17  [place]-3 Fish Oil 1200 MG Oral Capsule; Take 1 capsule twice daily  Requested for:    01WNG1281; Last Rx:01Obk1976 Ordered   18  Omeprazole 20 MG Oral Capsule Delayed Release; TAKE 1 CAPSULE EVERY          MORNING BEFORE BREAKFAST; Therapy: 35FPB1115 to (Evaluate:23Gcl1420)  Requested for: 50ERD7690; Last    Rx:45Bzr9412 Ordered   19  PredniSONE 10 MG Oral Tablet; TAKE 4 TABLETS DAILY FOR 3 DAYS,3 TABLETS    DAILY FOR 3 DAYS, 2 TABLETS DAILY FOR 3 DAYS AND 1 TABLET DAILY FOR    3 DAYS, THEN STOP; Therapy: 06XOK0016 to (Last Rx:05Jan2016)  Requested for: 23VFB3077 Ordered   20  Ranexa 500 MG Oral Tablet Extended Release 12 Hour; Take 1 tablet twice daily     Requested for: 93Xks6715; Last Rx:14Fry2354; Status: ACTIVE - Renewal Denied    Ordered   21  Simvastatin 40 MG Oral Tablet; TAKE 1 TABLET DAILY AS     DIRECTED; Therapy: 78WRE3008 to (Evaluate:70Huq2683)  Requested for: 62Drs3979; Last    Rx:23Til0257 Ordered   22  Tacrolimus 1 MG Oral Capsule; TAKE 2 CAPSULE Twice daily; Therapy: (Recorded:71Yuv7277) to Recorded   23  TiZANidine HCl - 4 MG Oral Tablet; take 1 tablet bedtime prn muscle spasm;     Therapy: 86ITJ6147 to (Evaluate:53Oli2809)  Requested for: 56TCJ8082; Last    Rx:27Iqv5262 Ordered    Allergies    1   No Known Drug Allergies    Signatures   Electronically signed by : John Benitez RN; Aug 24 2016 11:49AM EST                       (Author)

## 2018-01-15 NOTE — MISCELLANEOUS
Message  Patient has called stating that hydrocodone/acetaminophen 2 5/325 mg is not giving her enough pain relief  She does denies any side effects of the medication that she denies any nausea, abdominal pain, dizziness or constipation  We will try increasing the dose to 2 tablets every 6-8 hours on as needed basis for pain  Patient should monitor for any side effects  She is not allowed to take any Tylenol on top of that and she is aware of that  She will call me back if this medication regimen is not effective or will call me with any other questions or concerns        Signatures   Electronically signed by : Carolyn Cox MD; Jun 16 2016 12:50PM EST                       (Author)

## 2018-01-15 NOTE — RESULT NOTES
Verified Results  (1) LIPID PANEL, FASTING 29DHP1619 10:49AM Vick Mercer     Test Name Result Flag Reference   CHOLESTEROL, TOTAL 103 mg/dL L 125-200   HDL CHOLESTEROL 40 mg/dL L > OR = 46   TRIGLICERIDES 726 mg/dL H <150   LDL-CHOLESTEROL 28 mg/dL (calc)  <130   Desirable range <100 mg/dL for patients with CHD or  diabetes and <70 mg/dL for diabetic patients with  known heart disease  CHOL/HDLC RATIO 2 6 (calc)  < OR = 5 0   NON HDL CHOLESTEROL 63 mg/dL (calc)     Target for non-HDL cholesterol is 30 mg/dL higher than   LDL cholesterol target  (Q) TSH, 3RD GENERATION 12GIM9106 10:49AM Vick Mercer     Test Name Result Flag Reference   TSH 1 34 mIU/L  0 40-4 50     (Q) HEMOGLOBIN A1c 43HXA5307 10:49AM Vick Mercer   REPORT COMMENT:  FASTING:YES     Test Name Result Flag Reference   HEMOGLOBIN A1c 5 3 % of total Hgb  <5 7   According to ADA guidelines, hemoglobin A1c <7 0%  represents optimal control in non-pregnant diabetic  patients  Different metrics may apply to specific  patient populations  Standards of Medical Care in    Diabetes Care  2013;36:s11-s66     For the purpose of screening for the presence of  diabetes  <5 7%       Consistent with the absence of diabetes  5 7-6 4%    Consistent with increased risk for diabetes              (prediabetes)  >or=6 5%    Consistent with diabetes     This assay result is consistent with a decreased risk  of diabetes  Currently, no consensus exists for use of hemoglobin  A1c for diagnosis of diabetes for children         Plan  Type 2 diabetes mellitus    · Glimepiride 4 MG Oral Tablet (Amaryl)   · Glimepiride 2 MG Oral Tablet; TAKE 1 TABLET Daily

## 2018-01-16 NOTE — RESULT NOTES
Message   Recorded as Task   Date: 09/19/2016 03:46 PM, Created By: Meaghan Newman   Task Name: Follow Up   Assigned To: [id number] end procedure,Team   Regarding Patient: Nikkie Brown, Status: Active   CommentDecarmina Morocho - 19 Sep 2016 3:46 PM     TASK CREATED  Pt  is S/P RT PIRIFOMEVELIO JADE on 9/14 by Dr Delano Rae  F/U is on 10/20  Steven Hamlin - 21 Sep 2016 2:17 PM     TASK EDITED  Pt  reports 80% relief post inj , with no S/S of infection and improved ability to perform ADL's  Pt  also stated that tramadol was not working  Pt  has POVS on 10/20     Meaghan Newman - 21 Sep 2016 2:17 PM     TASK EDITED   Brian Vera - 21 Sep 2016 2:19 PM     TASK REPLIED TO: Previously Assigned To Carson Last                      aware, if she's having 80% relief no need for Tramadol currently, will follow up as planned        Signatures   Electronically signed by : Michelle Ybarra, ; Sep 21 2016  2:33PM EST                       (Author)

## 2018-01-16 NOTE — RESULT NOTES
Message   Recorded as Task   Date: 08/03/2016 09:55 AM, Created By: Silvano Iglesias   Task Name: Follow Up   Assigned To: [id number] end procedure,Team   Regarding Patient: Cintia Gomez, Status: Active   CommentLauren Hides - 03 Aug 2016 9:55 AM     TASK CREATED  Pt  is S/P RT SIJ INJ on 7/27/16 by Dr Ashley Back  F/U is on 8/26   Silvano Iglesias - 03 Aug 2016 4:19 PM     TASK EDITED   pt reports 10-15% relief post injection, with no S/S of infection  F/U is on 8/26     Darylene Hummingbird - 15 Aug 2016 4:24 PM     TASK REPLIED TO: Previously Assigned To Darylene Hummingbird                      aware agree        Signatures   Electronically signed by : David Landrum, ; Aug  3 2016  4:29PM EST                       (Author)

## 2018-01-16 NOTE — RESULT NOTES
Message   Recorded as Task   Date: 04/06/2017 02:00 PM, Created By: Jerry Mares   Task Name: Financial Authorization   Assigned To: Kristy Cassidy   Regarding Patient: Carolyne Pinto, Status: Active   Comment:    Kristy Cassidy - 06 Apr 2017 2:00 PM     TASK CREATED  Patient seen by Dennise Hammer yesterday and was scheduled for injection  While scheduling, I asked the patient about her insurance and she told me that she had Medicare primary and [employer] secondary  I pulled up her [facility] benefits online today and no COB was listed and no additional coverage was listed  I called ([phone]) and spoke with Loco Gonzales who confirmed that this is a MCR replacement plan  She does not have any other coverage and they are her only payor  She has a $35 copay for specialist and 20% co-insurance of which she has met for the year  We are out-of-network for this plan (as stated on our non-PAR list)  (call reference # [id number])  Katalina Disla discussed with Armando and confirmed with Dilia that we cannot see patient; Abby Gallardo  will call patient and cx her appts; she will also let patient know that she may be billed for her $35 copay from yesterday's OV

## 2018-01-17 NOTE — PROGRESS NOTES
Procedure Results    Consent Info: [phone] can speak with Dennis Castorena ()  Procedure: Aortic Arch Aortogram with Cerebral Angiogram with possible L Common Carotid Artery Stent  Date of Procedure: 3/24/16  Where: [facility]  Surgeon: NIKOLAS  Office Visit Pending: Yes   4/6/16 @ 10:45am in the [facility] office with CGD  Date Patient Called: 03/28/2016, 3/28/2016  no answer  Second Attempt: 03/30/2016 no answer  left message   Comments: multiple attempts made to contact patient with no answer  left message  Signatures   Electronically signed by : Jennifer Segura, [facility];  Apr 1 2016  1:55PM EST                       (Author)

## 2018-01-17 NOTE — RESULT NOTES
Message   Recorded as Task   Date: 03/13/2017 12:50 PM, Created By: Latesha Welch   Task Name: Miscellaneous   Assigned To: [id number] [place] [address] clinical,Team   Regarding Patient: Celio Maynard, Status: Active   CommentEbbrianne Breaux - 13 Mar 2017 12:50 PM     TASK CREATED  PT called and stated her medication, Tramadol HCL 50mg is not working  States the only pain med that works is Percocet  Call back number: [phone]   Sruthi Brothers - 13 Mar 2017 12:57 PM     TASK REASSIGNED: Previously Assigned To SPA joseph Mcgrath - 13 Mar 2017 1:08 PM     TASK REPLIED TO: Previously Assigned To Nino Herrera  not my patient  Dr Francoise Maria pt   Brigette Joshi - 13 Mar 2017 1:13 PM     TASK REASSIGNED: Previously Assigned To SPA es clinical,Team   Amanda Ramirez - 13 Mar 2017 1:41 PM     TASK REPLIED TO: Previously Assigned To [id number] [place] [address] clinical,Team                      recommend office visit for further review, either at [facility] visit or with me sooner if possible   Maddy Sheffield - 13 Mar 2017 1:54 PM     TASK REASSIGNED: Previously Assigned To [id number] [place] [address] Kirk - 13 Mar 2017 2:01 PM     TASK EDITED  There are no openings until early April for either one of you  She has an sovs on 4/5/17 at 1315  Please advise   thanks   Amanda Ramirez - 13 Mar 2017 2:16 PM     TASK EDITED  aware, continue current recs until seen by Nevin Pantoja   Electronically signed by : Claudette Stanford, ; Mar 13 2017  2:18PM EST                       (Author)

## 2018-01-17 NOTE — MISCELLANEOUS
Message   Recorded as Task   Date: 10/06/2016 02:45 PM, Created By: Trevor Alonso   Task Name: Med Renewal Request   Assigned To: [id number] [place] [address] clinical,Team   Regarding Patient: Trina Muller, Status: Active   Comment:    Jossie Cantor - 06 Oct 2016 2:45 PM     TASK CREATED  Caller: Self; Renew Medication; [phone] (Home)      ****Dr Krysta Correa PT ****  vm received from pt on We vm at 12:21 pm   Pt lmom stating she needs a prescription for pain pills  s/w patient about her need for pain "killers"  Pt stated that she is out of her 50mg pain killers (Tramodol)  and pt stated,"they didnt help anyway, I was taking them 2 at a time with no relief "  Then stated "I need something that will take away my pain and not knock me out " Pt reported a 7/10 pain and that she has taken 6 tylenol already today with minimal relief  Reports that pain in worse in AM and starts in Rt hip radiating to top of calf  Pt states that she gets out of bed in the morning and can't stand up straight, takes two tylenol and has to sit in front of a heater to obtain some relief  Advised pt that Marely Canales is not in the office this week and that we would send her information to on call  to see if they can do anything for her  Advised pt that will cb after speaking with on call    Pt verbalized understanding and was appreciative of phone call  please advise   [employer] 17 - 06 Oct 2016 3:21 PM     TASK REPLIED TO: Previously Assigned To [id number] [place] [address] clinical,Team  If she wants something non-sedating, I will trial the patient on diclofenac DR 75 mg q12hrs prn pain  Please ask her not to take any NSAIDs with this medication  [employer] [address] - 06 Oct 2016 3:23 PM     TASK REASSIGNED: Previously Assigned To [address] [place] [address] clinical,Team  On second thought, the patient is on clopidogrel, so NSAIDs and clopidogrel will increase the risk of bleeding substantially      Please call in Tylenol #3 one tab TID PRN pain, Disp #30 no refills to the patient's pharmacy  Jossie Cantor - 06 Oct 2016 3:49 PM     TASK REPLIED TO: Previously Assigned To [id number] [place] [address] clinical,Team   Jossie Cantor - 06 Oct 2016 3:53 PM     TASK REPLIED TO: Previously Assigned To [address] [place] [address] clinical,Team       s/w pt advised of above  Pt was advised when taking tylenol #3 she should not be taking any of her regular tylenol  Pt verbalized understanding of same  Pharmacy on file confirmed with pt  Advised that pharmacy on file will call her to  when rx available  Confirmed office visit on 10/20 arrival time of 2PM with Dr Lisa Nguyen  Pt appreciative of phone call  Darell Cantorie - 06 Oct 2016 4:03 PM     TASK REPLIED TO: Previously Assigned To [id number] [place] [address] clinical,Team   rx T#3 called into pt's pharmacy on file per Dr Lexx Castro request    Jeronimojamila Edmund - 06 Oct 2016 4:04 PM     TASK EDITED                 ***Pako Kole - 06 Oct 2016 4:53 PM     TASK REPLIED TO: Previously Assigned To [place]  Thanks  Active Problems    1  Abdominal aortic aneurysm, without rupture (441 4) (I71 4)   2  Arteriosclerosis of coronary artery (414 00) (I25 10)   3  Atherosclerotic PVD with intermittent claudication (440 21) (I73 9)   4  Benign essential hypertension (401 1) (I10)   5  Calcification of aorta (440 0) (I70 0)   6  Carotid stenosis, asymptomatic, bilateral (433 10,433 30) (I65 23)   7  Chronic obstructive pulmonary disease (496) (J44 9)   8  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)   9  Convulsive disorder (780 39) (R56 9)   10  Current smoker (305 1) (F17 200)   11  Depression (311) (F32 9)   12  Flu vaccine need (V04 81) (Z23)   13  GERD without esophagitis (530 81) (K21 9)   14  Headache, migraine (346 90) (G43 909)   15  Hyperlipidemia (272 4) (E78 5)   16  Low back pain (724 2) (M54 5)   17  On anticoagulant therapy (V58 61) (Z79 01)   18  Peripheral arterial disease (443 9) (I73 9)   19  Peripheral neuropathy (356 9) (G62 9)   20   Piriformis syndrome, right (355 0) (G57 01)   21  Sacroiliac joint dysfunction of right side (724 6) (M53 3)   22  Sacroiliitis (720 2) (M46 1)   23  Sciatica of right side (724 3) (M54 31)   24  Status post renal autotransplantation (V42 0) (Z94 0)   25  Stenosis of left carotid artery (433 10) (I65 22)   26  Tobacco use (305 1) (Z72 0)   27  Type 2 diabetes mellitus (250 00) (E11 9)    Current Meds   1  Acetaminophen 500 MG Oral Tablet; Take 2 caplets every 6 hours as needed  Donot   take more than 6 caplets in 24 hours; Therapy: 32Pcn7599 to (Last Rx:21Ivm7571) Ordered   2  Acetaminophen-Codeine #3 300-30 MG Oral Tablet; TAKE 1 TABLET 3 TIMES DAILY AS   NEEDED FOR PAIN;   Therapy: 61LJP4534 to (Evaluate:16Oct2016) Recorded   3  Aspirin 81 MG TABS Recorded   4  Bumetanide 1 MG Oral Tablet; Take 1 tablet daily; Therapy: 17FMZ3162 to (Evaluate:47Rft8826)  Requested for: 71YXQ6764; Last   Rx:22Jun2016 Ordered   5  ClonazePAM 0 5 MG Oral Tablet (KlonoPIN); TAKE 1 TABLET AT BEDTIME; Last   YV:05ZUR9581 Ordered   6  Clopidogrel Bisulfate 75 MG Oral Tablet (Plavix); Take 1 tablet daily; Therapy: 64Isk7689 to (Evaluate:14Nov2016)  Requested for: 56Wgy0708; Last   Rx:28Wfq0769 Ordered   7  Divalproex Sodium 500 MG Oral Tablet Delayed Release (Depakote); take 1 tablet twice   a day; Therapy: 94IZS9740 to (Evaluate:16Jun2017)  Requested for: 57UIX4768; Last   Rx:22Jun2016 Ordered   8  DULoxetine HCl - 30 MG Oral Capsule Delayed Release Particles (Cymbalta); take 1   capsule daily; Therapy: 73OQQ7499 to (Evaluate:16Jun2017)  Requested for: 29GVJ3719; Last   Rx:22Jun2016 Ordered   9  Famotidine 40 MG Oral Tablet; take 1 tablet twice a day; Therapy: 07MFV4669 to (Evaluate:69Lxh9075)  Requested for: 17QJL3009; Last   Rx:22Jun2016 Ordered   10  Fioricet -40 MG Oral Capsule; TAKE 1 CAPSULE Every 6 hours; Therapy: 83NVW3858 to (Evaluate:27Jan2016)  Requested for: 59CWT8078; Last    Rx:19Jan2016 Ordered   11   FLUoxetine HCl - 20 MG Oral Capsule (PROzac); TAKE 1 CAPSULE Daily; Therapy: 68Hru3654 to (Evaluate:94Ptw1273)  Requested for: 22RWJ5699; Last    Rx:54Tok7539 Ordered   12  Isosorbide Mononitrate ER 30 MG Oral Tablet Extended Release 24 Hour; Take 1 tablet    daily; Therapy: 75SLI5496 to (Evaluate:15Non3856)  Requested for: 03Dkd3806; Last    Rx:86Lir4470 Ordered   13  Janumet  MG Oral Tablet; TAKE 0 5 TABLET Twice daily; Therapy: 66LVM3022 to Recorded   14  Janumet  MG Oral Tablet; take 1 tablet twice a day; Therapy: 16BUB0179 to (Evaluate:91Nty2612)  Requested for: 22Dvc0768; Last    Rx:67Qal5542 Ordered   15  Lidocaine 5 % External Ointment; APPLY TO AFFECTED AREA FOR BACK PAIN 3    TIMES  A DAY; Therapy: 99FWI4289 to (Evaluate:17Ktq3864)  Requested for: 47OVA0240; Last    WN:83TCI3423 Ordered   16  Magnesium Oxide 500 MG Oral Tablet; Take 1 tablet daily  Requested for: 94MCP7893;    Last Rx:14Xtt9146 Ordered   17  Metoprolol Succinate ER 25 MG Oral Tablet Extended Release 24 Hour; take 1 tablet    twice a day; Therapy: 42NMU4176 to (Evaluate:54Pms3775)  Requested for: 82Azq0999; Last    Rx:86Fhr0900 Ordered   18  Mycophenolic Acid 561 MG Oral Tablet Delayed Release (Myfortic); take 1 tablet twice a    day; Therapy: 98JQX1167 to (Evaluate:06Dal2618)  Requested for: 85YBO3093; Last    Rx:18Ynb2722 Ordered   19  [place]-3 Fish Oil 1200 MG Oral Capsule; Take 1 capsule twice daily  Requested for:    87NMV6562; Last Rx:91Qwo3745 Ordered   20  Omeprazole 20 MG Oral Capsule Delayed Release; TAKE 1 CAPSULE EVERY          MORNING BEFORE BREAKFAST; Therapy: 93COM7876 to (Evaluate:97Nlo5766)  Requested for: 79GLB8316; Last    Rx:88Uqc5691 Ordered   21  Ranexa 500 MG Oral Tablet Extended Release 12 Hour; Take 1 tablet twice daily     Requested for: 33Tqp5754; Last Rx:44Pwb4777; Status: ACTIVE - Renewal Denied    Ordered   22  Simvastatin 40 MG Oral Tablet; TAKE 1 TABLET DAILY AS     DIRECTED;     Therapy: 89JNF5685 to (Evaluate:19Bnq8994)  Requested for: 23Eju5749; Last    Rx:39Ken0432 Ordered   23  Tacrolimus 1 MG Oral Capsule; TAKE 2 CAPSULE Twice daily; Therapy: (Recorded:70Lkn7510) to Recorded   24  TiZANidine HCl - 4 MG Oral Tablet; take 1 tablet bedtime prn muscle spasm; Therapy: 37MAY4662 to (Evaluate:65Ppo2959)  Requested for: 62FVD2122; Last    Rx:75Rps8682 Ordered   25  TraMADol HCl - 50 MG Oral Tablet; TAKE 1 TABLET EVERY 8 HOURS AS NEEDED; Therapy: 01URC1744 to (Evaluate:72Vjl9042); Last Rx:59Ylt9097 Ordered    Allergies    1   No Known Drug Allergies    Signatures   Electronically signed by : Meet Chau RN; Oct  7 2016  7:41AM EST                       (Author)

## 2018-01-25 PROBLEM — E11.9 DIABETES (HCC): Status: ACTIVE | Noted: 2018-01-01

## 2018-01-25 PROBLEM — I73.9 PAD (PERIPHERAL ARTERY DISEASE) (HCC): Status: ACTIVE | Noted: 2018-01-01

## 2018-01-25 PROBLEM — R10.9 ABDOMINAL PAIN: Status: ACTIVE | Noted: 2018-01-01

## 2018-01-25 PROBLEM — G40.909 SEIZURE DISORDER (HCC): Status: ACTIVE | Noted: 2018-01-01

## 2018-01-25 PROBLEM — R19.7 DIARRHEA: Status: ACTIVE | Noted: 2018-01-01

## 2018-01-25 PROBLEM — N17.9 ACUTE RENAL FAILURE (ARF) (HCC): Status: ACTIVE | Noted: 2018-01-01

## 2018-01-25 PROBLEM — Z94.0 RENAL TRANSPLANT, STATUS POST: Status: ACTIVE | Noted: 2018-01-01

## 2018-01-25 PROBLEM — Z72.0 TOBACCO ABUSE: Status: ACTIVE | Noted: 2018-01-01

## 2018-01-25 PROBLEM — E87.6 HYPOKALEMIA: Status: ACTIVE | Noted: 2018-01-01

## 2018-01-25 PROBLEM — I10 HYPERTENSION: Status: ACTIVE | Noted: 2018-01-01

## 2018-01-25 PROBLEM — I25.10 CAD (CORONARY ARTERY DISEASE): Status: ACTIVE | Noted: 2018-01-01

## 2018-01-25 NOTE — H&P
History and Physical - [facility]    Patient Information: Jayne Barnes 69 y o  female MRN: [id number]  Unit/Bed#: [facility] [address] 2 [address] 211-02 Encounter: [id number]  Admitting Physician: Johan Bruce PA-C  PCP: Reinaldo Menchaca DO  Date of Admission:  01/25/18    Assessment/Plan:    Hospital Problem List:     Principal Problem:    Acute renal failure (ARF) ([place] [address] )  Active Problems:    Abdominal pain    Diarrhea    Renal transplant, status post    CAD (coronary artery disease)    PAD (peripheral artery disease) ([facility] )    Hypokalemia    Diabetes ([place] [address] )    Seizure disorder ([place] [address] )    Hypertension    Tobacco abuse      Plan for the Primary Problem(s):  · MARKUS with history of renal transplant:  · Suspect pre-renal given poor oral intake, GI loss  · IVF hydration  · Hold diuretic  · Avoid nephrotoxins  · Trend creatinine  · Check Prograf level  · Diarrhea/abdominal pain:  · CT abdomen negative  · IVF hydration  · F/U stool studies  · Given post-prandial symptoms and known vascular disease, check mesenteric duplex    Plan for Additional Problems:   · Hx polycystic kidney/solitary kidney, s/p right renal transplant:  · Continue anti-rejection meds  · Check Prograf level  · CAD:  · S/p CABG, stents  · Continue ASA, Plavix, Imdur, BB, Ranexa, statin  · PAD:  · S/p L SFA PTA/stent  · Continue ASA, Plavix, statin  · Hypokalemia:  · Potassium was repleted  · Type 2 DM:  · Diet-controlled  · Seizure disorder:  · Keppra  · HTN:  · BPs uncontrolled  · Continue home meds, PRN hydralazine  Adjustments as needed  · Tobacco abuse:  · Patient declines nicotine patch    VTE Prophylaxis: Heparin  / sequential compression device   Code Status: Full code  POLST: There is no POLST form on file for this patient (pre-hospital)    Anticipated Length of Stay:  Patient will be admitted on an Inpatient basis with an anticipated length of stay of  > 2 midnights     Justification for Hospital Stay: MARKUS in patient with renal transplant, workup of diarrhea/abdominal pain  Total Time for Visit, including Counseling / Coordination of Care: 45 minutes  Greater than 50% of this total time spent on direct patient counseling and coordination of care  Chief Complaint:   Abdominal pain, diarrhea    History of Present Illness:    Jorge Luis Cary is a 69 y o  female with a history of renal transplant secondary to polycystic solitary kidney, CAD s/p CABG/stents, PAD, diet-controlled diabetes, and tobacco abuse who presents with abdominal pain and diarrhea  Symptoms have been present x 2 weeks  She reports severe, generalized abdominal pain only after eating  She also reports non-bloody diarrhea x 2 weeks  Having 2-3 episodes daily  No fevers at home  No nausea or vomiting  Denies recent antibiotic use, hospitalization, travel  Never had symptoms like this before  Due to pain, she has not been eating much  On admission, CT abdomen/pelvis was done which was negative  Her creatinine was elevated at 1 63  Review of Systems:    Review of Systems   Constitutional: Negative for chills and fever  HENT: Negative  Eyes: Negative  Respiratory: Negative  Cardiovascular: Negative  Gastrointestinal: Positive for abdominal pain and diarrhea  Negative for blood in stool, nausea and vomiting  Endocrine: Negative  Genitourinary: Negative  Musculoskeletal: Negative  Skin: Negative  Allergic/Immunologic: Negative  Neurological: Negative  Hematological: Negative  Psychiatric/Behavioral: Negative          Past Medical and Surgical History:     Past Medical History:   Diagnosis Date    Anxiety     Arteriosclerosis of left carotid artery     RESOLVED: [id number]    CAD (coronary artery disease)     Cardiac disorder     Carotid artery stenosis     Cataract     RESOLVED: [id number]    Chronic kidney disease     Current chronic use of systemic steroids     RESOLVED: [id number]    Depression     Diabetes mellitus (HCC)     Headache     [facility] failure ([facility] )     Hyperlipidemia     Hypertension     Insomnia     Kidney transplant complication     Migraine     RESOLVED: [id number]    Nicotine dependence     Perforation of left tympanic membrane        Past Surgical History:   Procedure Laterality Date    ARTERIOGRAM Left 3/24/2016    Procedure: AORTIC ARCH AORTOGRAM/CEREBRAL ANGIOGRAM ;  Surgeon: Ninoska Pena DO;  Location: [facility];  Service:     BREAST BIOPSY      OPEN; ONSET: 2006    CARDIAC CATHETERIZATION      OUTCOME: SUCCESSFUL; ONSET: AUG2008    CAROTID ARTERY ANGIOPLASTY Left     managed by:Bernardo Jaimes;  onset: 24Mar2016    CAROTID STENT Left 3/24/2016    Procedure: COMMON CAROTID ARTERY STENT PLACEMENT ;  Surgeon: Ninoska Pena DO;  Location: [facility];  Service:     CATARACT EXTRACTION W/  INTRAOCULAR LENS IMPLANT Bilateral     CORONARY ANGIOPLASTY      onset: 2006    CORONARY ANGIOPLASTY WITH STENT PLACEMENT      1 INITIAL    CORONARY ARTERY BYPASS GRAFT      X 3; ONSET: 1998    CORONARY STENT PLACEMENT      NEPHRECTOMY TRANSPLANTED ORGAN      OTHER SURGICAL HISTORY      EMG Dynamic surface, 1-12 muscles; onset: [id number]    TRANSLUMINAL ANGIOPLASTY Left     intraoperative; LEFT SFA ANGIOPLAST; LEFT SFA SELF EXPANDING STENT; Onset: [id number]    TRANSPLANTATION RENAL      LAST ASSESSED: [id number]       Meds/Allergies:    Prior to Admission medications    Medication Sig Start Date End Date Taking? Authorizing Provider   aspirin 81 MG tablet Take 81 mg by mouth every evening  Yes Historical Provider, MD   bumetanide (BUMEX) 1 mg tablet Take 1 mg by mouth every evening  Yes Historical Provider, MD   Calcium Carbonate (CALTRATE 600 PO) Take 1 tablet by mouth every evening  Yes Historical Provider, MD   clonazePAM (KlonoPIN) 0 5 mg tablet Take 0 5 mg by mouth every evening  Yes Historical Provider, MD   clopidogrel (PLAVIX) 75 mg tablet Take 75 mg by mouth daily     Yes Historical Provider, MD divalproex sodium (DEPAKOTE) 500 mg EC tablet Take 500 mg by mouth 2 (two) times a day  Yes Historical Provider, MD   DULoxetine (CYMBALTA) 30 mg delayed release capsule Take 30 mg by mouth daily  Yes Historical Provider, MD   FLUoxetine (PROzac) 20 MG tablet Take 20 mg by mouth daily  Yes Historical Provider, MD   isosorbide mononitrate (IMDUR) 30 mg 24 hr tablet Take 30 mg by mouth daily  Yes Historical Provider, MD   L-Methylfolate-B6-B12 (METANX PO) Take by mouth daily  Yes Historical Provider, MD   Magnesium Chloride (MAG64 PO) Take by mouth daily at bedtime  Yes Historical Provider, MD   magnesium chloride (MAG64) 535 mg Take 128 mg by mouth once a week  mon wed fri bid   Yes Historical Provider, MD   metoprolol tartrate (LOPRESSOR) 25 mg tablet Take 25 mg by mouth 2 (two) times a day  Yes Historical Provider, MD   Multiple Vitamin (MULTIVITAMIN) tablet Take 1 tablet by mouth daily  Yes Historical Provider, MD   mycophenolate (MYFORTIC) 360 MG TBEC Take 360 mg by mouth 2 (two) times a day  Yes Historical Provider, MD   NON FORMULARY    Yes Historical Provider, MD   Omega-3 Fatty Acids (FISH OIL) 1200 MG CAPS Take by mouth  Yes Historical Provider, MD   omeprazole (PriLOSEC) 20 mg delayed release capsule Take 20 mg by mouth daily  Yes Historical Provider, MD   predniSONE 5 mg tablet Take 5 mg by mouth daily  Yes Historical Provider, MD   ranolazine (RANEXA) 500 mg 12 hr tablet Take 500 mg by mouth 2 (two) times a day  Yes Historical Provider, MD   simvastatin (ZOCOR) 40 mg tablet Take 40 mg by mouth every morning  Yes Historical Provider, MD   tacrolimus (PROGRAF) 1 mg capsule Take 1 mg by mouth 2 (two) times a day  Yes Historical Provider, MD   acetaminophen (TYLENOL) 325 mg tablet Take 2 tablets (650 mg total) by mouth every 6 (six) hours as needed for mild pain   3/25/16   Alli Tran PA-C   sitaGLIPtin-metFORMIN (JANUMET)  MG per tablet Take 1 tablet by mouth 2 (two) times a day with meals  1/25/18  Historical Provider, MD     I have reviewed home medications using allscripts  Allergies: No Known Allergies    Social History:     Marital Status: /Civil Union   Occupation:   Patient Pre-hospital Living Situation: Lives at home  Patient Pre-hospital Level of Mobility: Ambulates independently  Patient Pre-hospital Diet Restrictions: None  Substance Use History:   History   Alcohol Use No     History   Smoking Status    Current Every Day Smoker    Packs/day: 1 00    Years: 50 00   Smokeless Tobacco    Never Used     History   Drug Use No       Family History:    non-contributory    Physical Exam:     Vitals:   Blood Pressure: (!) 192/80 (01/25/18 1426)  Pulse: 71 (01/25/18 1426)  Temperature: (!) 97 2 °F (36 2 °C) (01/25/18 1426)  Temp Source: Tympanic (01/25/18 1426)  Respirations: 18 (01/25/18 1426)  Weight - Scale: 75 9 kg (167 lb 5 3 oz) (01/25/18 0819)  SpO2: 97 % (01/25/18 1426)    Physical Exam   Constitutional: She is oriented to person, place, and time  No distress  HENT:   Head: Normocephalic and atraumatic  Eyes: No scleral icterus  Neck: Normal range of motion  Neck supple  Cardiovascular: Normal rate, regular rhythm and normal heart sounds  Pulmonary/Chest: Effort normal and breath sounds normal  No respiratory distress  She has no wheezes  She has no rales  Abdominal: Soft  Bowel sounds are normal  She exhibits no distension  There is no tenderness  There is no rebound  Musculoskeletal: She exhibits no edema  Neurological: She is alert and oriented to person, place, and time  Skin: Skin is warm and dry  She is not diaphoretic  Psychiatric: She has a normal mood and affect  Her behavior is normal  Thought content normal        Additional Data:     Lab Results: I have personally reviewed pertinent reports          Results from last 7 days  Lab Units 01/25/18  0848   WBC Thousand/uL 11 39*   HEMOGLOBIN g/dL 18 3*   HEMATOCRIT % 54 4* PLATELETS Thousands/uL 104*   NEUTROS PCT % 61   LYMPHS PCT % 28   MONOS PCT % 10   EOS PCT % 1       Results from last 7 days  Lab Units 01/25/18  0848   SODIUM mmol/L 141   POTASSIUM mmol/L 3 2*   CHLORIDE mmol/L 102   CO2 mmol/L 28   BUN mg/dL 17   CREATININE mg/dL 1 63*   CALCIUM mg/dL 9 7   TOTAL PROTEIN g/dL 6 5   BILIRUBIN TOTAL mg/dL 0 56   ALK PHOS U/L 51   ALT U/L 14   AST U/L 16   GLUCOSE RANDOM mg/dL 167*           Imaging: I have personally reviewed pertinent reports  Ct Abdomen Pelvis Wo Contrast    Result Date: 1/25/2018  Narrative: CT ABDOMEN AND PELVIS WITHOUT IV CONTRAST INDICATION:  75-year-old woman with abdominal pain  COMPARISON: Abdominal CT 5/9/2016  TECHNIQUE:  CT examination of the abdomen and pelvis was performed without intravenous contrast   Reformatted images were created in axial, sagittal, and coronal planes  Radiation dose length product (DLP) for this visit:  657 mGy-cm   This examination, like all CT scans performed in the [facility], was performed utilizing techniques to minimize radiation dose exposure, including the use of iterative reconstruction and automated exposure control  Enteric contrast was administered  FINDINGS: ABDOMEN LOWER CHEST:  No significant abnormalities identified in the lower chest  LIVER/BILIARY TREE:  One or more subcentimeter sharply circumscribed low-density hepatic lesion(s) are noted, too small to accurately characterize, but statistically most likely to represent subcentimeter hepatic cysts  Hepatic contours are normal   No  biliary dilatation  GALLBLADDER:  There are gallstone(s) within the gallbladder, without pericholecystic inflammatory changes  SPLEEN:  Unremarkable  PANCREAS:  Unremarkable  ADRENAL GLANDS:  Unremarkable  KIDNEYS/URETERS:  Status post left nephrectomy  Right polycystic kidney disease  Right lower quadrant renal allograft is unremarkable on noncontrast CT  No hydronephrosis   STOMACH AND BOWEL: Unremarkable  APPENDIX:  A normal appendix was visualized  ABDOMINOPELVIC CAVITY:  No ascites or free intraperitoneal air  No lymphadenopathy  VESSELS:  Aortic atherosclerosis  3 1 cm infrarenal abdominal aortic aneurysm (series 602 image 74) is minimally changed since the prior study  PELVIS REPRODUCTIVE ORGANS:  Unremarkable for patient's age  URINARY BLADDER:  Unremarkable  ABDOMINAL WALL/INGUINAL REGIONS:  Unremarkable  OSSEOUS STRUCTURES:  No acute fracture or destructive osseous lesion  Impression: 1  Status post left nephrectomy  Right lower quadrant renal allograft is unremarkable on noncontrast CT  2   No acute inflammatory process in the abdomen or pelvis  3   3 1 cm infrarenal abdominal aortic aneurysm is minimally changed since the prior study  4   Cholelithiasis  Workstation performed: [id number]       EKG, Pathology, and Other Studies Reviewed on Admission:   · EKG:     Allscripts / Epic Records Reviewed: Yes     ** Please Note: This note has been constructed using a voice recognition system   **

## 2018-01-25 NOTE — ED PROVIDER NOTES
History  Chief Complaint   Patient presents with    Abdominal Pain     Reports generalized abdominal pain, n/v/d, and generalized weakness that started two weeks ago  76-year-old female with a history of chronic kidney disease status post kidney transplant 10 years ago, hypertension, diabetes, coronary artery disease presents to the emergency department with a 2 week history of abdominal pain, decreased appetite, nausea and intermittent diarrhea  Patient denies fevers or chills  She states whenever she tries to eat or drink anything she gets sharp pain in the middle of her abdomen  She has occasional nausea without vomiting  She also states that she has had intermittent diarrhea throughout the 2 weeks  No blood in the stool  She complains of generalized weakness and was told she is dehydrated  She states that she lost about 10 pounds over the last 2 weeks  No known ill contacts or recent travel          History provided by:  Patient   used: No    Abdominal Pain   Pain location:  Periumbilical  Pain quality: sharp    Pain radiates to:  Does not radiate  Pain severity:  Unable to specify  Onset quality:  Gradual  Duration:  2 weeks  Timing:  Intermittent  Progression:  Waxing and waning  Chronicity:  New  Context: eating and previous surgery    Context: not alcohol use, not diet changes, not recent illness, not recent travel, not sick contacts, not suspicious food intake and not trauma    Relieved by:  Nothing  Worsened by:  Eating  Ineffective treatments:  Liquids  Associated symptoms: anorexia, diarrhea, fatigue and nausea    Associated symptoms: no belching, no chest pain, no chills, no constipation, no cough, no dysuria, no fever, no flatus, no hematemesis, no hematochezia, no hematuria, no melena, no shortness of breath, no sore throat and no vomiting    Diarrhea:     Quality:  Semi-solid    Number of occurrences:  Intermittent    Duration:  2 weeks    Timing:  Intermittent Progression:  Unchanged  Risk factors: being elderly and multiple surgeries    Risk factors: no alcohol abuse, no aspirin use, no NSAID use, not obese and no recent hospitalization        Prior to Admission Medications   Prescriptions Last Dose Informant Patient Reported? Taking? Calcium Carbonate (CALTRATE 600 PO) 1/23/2018 at Unknown time  Yes Yes   Sig: Take 1 tablet by mouth every evening  DULoxetine (CYMBALTA) 30 mg delayed release capsule 1/23/2018 at Unknown time  Yes Yes   Sig: Take 30 mg by mouth daily  FLUoxetine (PROzac) 20 MG tablet 1/23/2018 at Unknown time  Yes Yes   Sig: Take 20 mg by mouth daily  L-Methylfolate-B6-B12 (METANX PO) 1/23/2018 at Unknown time  Yes Yes   Sig: Take by mouth daily  Magnesium Chloride (MAG64 PO) 1/23/2018 at Unknown time  Yes Yes   Sig: Take by mouth daily at bedtime  Multiple Vitamin (MULTIVITAMIN) tablet 1/23/2018 at Unknown time  Yes Yes   Sig: Take 1 tablet by mouth daily  NON FORMULARY 1/23/2018 at Unknown time  Yes Yes   Omega-3 Fatty Acids (FISH OIL) 1200 MG CAPS 1/23/2018 at Unknown time  Yes Yes   Sig: Take by mouth  acetaminophen (TYLENOL) 325 mg tablet   No No   Sig: Take 2 tablets (650 mg total) by mouth every 6 (six) hours as needed for mild pain  aspirin 81 MG tablet 1/23/2018 at Unknown time  Yes Yes   Sig: Take 81 mg by mouth every evening  bumetanide (BUMEX) 1 mg tablet 1/23/2018 at Unknown time  Yes Yes   Sig: Take 1 mg by mouth every evening  clonazePAM (KlonoPIN) 0 5 mg tablet 1/23/2018 at Unknown time  Yes Yes   Sig: Take 0 5 mg by mouth every evening  clopidogrel (PLAVIX) 75 mg tablet 1/23/2018 at Unknown time  Yes Yes   Sig: Take 75 mg by mouth daily  divalproex sodium (DEPAKOTE) 500 mg EC tablet 1/23/2018 at Unknown time  Yes Yes   Sig: Take 500 mg by mouth 2 (two) times a day  isosorbide mononitrate (IMDUR) 30 mg 24 hr tablet 1/23/2018 at Unknown time  Yes Yes   Sig: Take 30 mg by mouth daily     magnesium chloride (MAG64) 535 mg 1/23/2018 at Unknown time  Yes Yes   Sig: Take 128 mg by mouth once a week  mon wed fri bid   metoprolol tartrate (LOPRESSOR) 25 mg tablet 1/23/2018 at Unknown time  Yes Yes   Sig: Take 25 mg by mouth 2 (two) times a day  mycophenolate (MYFORTIC) 360 MG TBEC 1/23/2018 at Unknown time  Yes Yes   Sig: Take 360 mg by mouth 2 (two) times a day  omeprazole (PriLOSEC) 20 mg delayed release capsule 1/23/2018 at Unknown time  Yes Yes   Sig: Take 20 mg by mouth daily  predniSONE 5 mg tablet 1/23/2018 at Unknown time  Yes Yes   Sig: Take 5 mg by mouth daily  ranolazine (RANEXA) 500 mg 12 hr tablet 1/23/2018 at Unknown time  Yes Yes   Sig: Take 500 mg by mouth 2 (two) times a day  simvastatin (ZOCOR) 40 mg tablet 1/23/2018 at Unknown time  Yes Yes   Sig: Take 40 mg by mouth every morning  tacrolimus (PROGRAF) 1 mg capsule Past Week at Unknown time  Yes Yes   Sig: Take 1 mg by mouth 2 (two) times a day        Facility-Administered Medications: None       Past Medical History:   Diagnosis Date    Anxiety     Arteriosclerosis of left carotid artery     CAD (coronary artery disease)     Carotid artery stenosis     Chronic kidney disease     Depression     Diabetes mellitus (HCC)     Headache     Heart failure (HCC)     Hyperlipidemia     Hypertension        Past Surgical History:   Procedure Laterality Date    ARTERIOGRAM Left 3/24/2016    Procedure: AORTIC ARCH AORTOGRAM/CEREBRAL ANGIOGRAM ;  Surgeon: Dana Mackey DO;  Location: [facility];  Service:     CAROTID STENT Left 3/24/2016    Procedure: COMMON CAROTID ARTERY STENT PLACEMENT ;  Surgeon: Dana Mackey DO;  Location: [facility];  Service:     CATARACT EXTRACTION W/  INTRAOCULAR LENS IMPLANT Bilateral     CORONARY ARTERY BYPASS GRAFT      X 3    CORONARY STENT PLACEMENT      NEPHRECTOMY TRANSPLANTED ORGAN         Family History   Problem Relation Age of Onset    Coronary artery disease Mother     Gout Mother    [facility] Cancer Father      I have reviewed and agree with the history as documented  Social History   Substance Use Topics    Smoking status: Current Every Day Smoker     Packs/day: 1 00     Years: 50 00    Smokeless tobacco: Never Used    Alcohol use No        Review of Systems   Constitutional: Positive for appetite change, fatigue and unexpected weight change  Negative for chills, diaphoresis and fever  HENT: Negative  Negative for congestion, rhinorrhea and sore throat  Eyes: Negative  Negative for discharge, redness and itching  Respiratory: Negative  Negative for apnea, cough, chest tightness, shortness of breath and wheezing  Cardiovascular: Negative for chest pain, palpitations and leg swelling  Gastrointestinal: Positive for abdominal pain, anorexia, diarrhea and nausea  Negative for abdominal distention, blood in stool, constipation, flatus, hematemesis, hematochezia, melena and vomiting  Endocrine: Negative  Genitourinary: Negative  Negative for dysuria, flank pain, frequency, hematuria and urgency  Musculoskeletal: Negative  Negative for back pain  Skin: Negative  Allergic/Immunologic: Negative  Neurological: Negative  Negative for dizziness, syncope, weakness, light-headedness, numbness and headaches  Hematological: Negative  All other systems reviewed and are negative  Physical Exam  ED Triage Vitals   Temperature Pulse Respirations Blood Pressure SpO2   01/25/18 0820 01/25/18 0819 01/25/18 0819 01/25/18 0819 01/25/18 0819   98 1 °F (36 7 °C) 85 18 (!) 190/113 98 %      Temp Source Heart Rate Source Patient Position - Orthostatic VS BP Location FiO2 (%)   01/25/18 0820 -- -- -- --   Oral          Pain Score       01/25/18 0819       No Pain           Orthostatic Vital Signs  Vitals:    01/25/18 0819 01/25/18 1033   BP: (!) 190/113 135/59   Pulse: 85 73       Physical Exam   Constitutional: She is oriented to person, place, and time   She appears well-developed and well-nourished  Non-toxic appearance  She does not have a sickly appearance  She does not appear ill  No distress  HENT:   Head: Normocephalic and atraumatic  Right Ear: External ear normal    Left Ear: External ear normal    Nose: Nose normal    Mouth/Throat: Mucous membranes are dry  No oropharyngeal exudate  Eyes: Conjunctivae are normal  Pupils are equal, round, and reactive to light  Right eye exhibits no discharge  Left eye exhibits no discharge  No scleral icterus  Cardiovascular: Normal rate, regular rhythm and normal heart sounds  Exam reveals no gallop and no friction rub  No murmur heard  Pulmonary/Chest: Effort normal and breath sounds normal  No respiratory distress  She has no wheezes  She has no rales  She exhibits no tenderness  Abdominal: Soft  Normal appearance and bowel sounds are normal  She exhibits no distension and no mass  There is tenderness in the epigastric area and periumbilical area  There is no rebound and no guarding  No hernia  Musculoskeletal: Normal range of motion  She exhibits no edema, tenderness or deformity  Neurological: She is alert and oriented to person, place, and time  She has normal reflexes  She exhibits normal muscle tone  Skin: Skin is warm and dry  No rash noted  She is not diaphoretic  No erythema  No pallor  Psychiatric: She has a normal mood and affect  Nursing note and vitals reviewed        ED Medications  Medications   sodium chloride 0 9 % infusion (not administered)   sodium chloride 0 9 % bolus 1,000 mL (0 mL Intravenous Stopped 1/25/18 1144)   potassium chloride (K-DUR,KLOR-CON) CR tablet 40 mEq (40 mEq Oral Given 1/25/18 1025)   iohexol (OMNIPAQUE) 240 MG/ML solution 50 mL (50 mL Oral Given 1/25/18 1107)       Diagnostic Studies  Results Reviewed     Procedure Component Value Units Date/Time    Valproic acid level, total [[id number]]  (Abnormal) Collected:  01/25/18 1029    Lab Status:  Final result Specimen:  Blood from Arm, Right Updated:  01/25/18 1100     Valproic Acid, Total 13 (L) ug/mL     Stool Enteric Bacterial Panel by PCR [[id number]] Collected:  01/25/18 1036    Lab Status: In process Specimen:  Stool from Per Rectum Updated:  01/25/18 1039    Clostridium difficile toxin by PCR [[id number]] Collected:  01/25/18 1032    Lab Status: In process Specimen:  Stool from Per Rectum Updated:  01/25/18 1036    Comprehensive metabolic panel [[id number]]  (Abnormal) Collected:  01/25/18 0848    Lab Status:  Final result Specimen:  Blood from Arm, Right Updated:  01/25/18 0909     Sodium 141 mmol/L      Potassium 3 2 (L) mmol/L      Chloride 102 mmol/L      CO2 28 mmol/L      Anion Gap 11 mmol/L      BUN 17 mg/dL      Creatinine 1 63 (H) mg/dL      Glucose 167 (H) mg/dL      Calcium 9 7 mg/dL      AST 16 U/L      ALT 14 U/L      Alkaline Phosphatase 51 U/L      Total Protein 6 5 g/dL      Albumin 2 9 (L) g/dL      Total Bilirubin 0 56 mg/dL      eGFR 32 ml/min/1 73sq m     Narrative:         National Kidney Disease Education Program recommendations are as follows:  GFR calculation is accurate only with a steady state creatinine  Chronic Kidney disease less than 60 ml/min/1 73 sq  meters  Kidney failure less than 15 ml/min/1 73 sq  meters      Lipase [[id number]]  (Abnormal) Collected:  01/25/18 0848    Lab Status:  Final result Specimen:  Blood from Arm, Right Updated:  01/25/18 0909     Lipase 63 (L) u/L     CBC and differential [[id number]]  (Abnormal) Collected:  01/25/18 0848    Lab Status:  Final result Specimen:  Blood from Arm, Right Updated:  01/25/18 0854     WBC 11 39 (H) Thousand/uL      RBC 5 33 (H) Million/uL      Hemoglobin 18 3 (H) g/dL      Hematocrit 54 4 (H) %       (H) fL      MCH 34 3 pg      MCHC 33 6 g/dL      RDW 16 0 (H) %      MPV 12 0 fL      Platelets 631 (L) Thousands/uL      nRBC 0 /100 WBCs      Neutrophils Relative 61 %      Lymphocytes Relative 28 %      Monocytes Relative 10 %      Eosinophils Relative 1 % Basophils Relative 0 %      Neutrophils Absolute 6 94 Thousands/µL      Lymphocytes Absolute 3 22 Thousands/µL      Monocytes Absolute 1 16 Thousand/µL      Eosinophils Absolute 0 06 Thousand/µL      Basophils Absolute 0 01 Thousands/µL     POCT urinalysis dipstick [[id number]]     Lab Status:  No result Specimen:  Urine                  CT abdomen pelvis wo contrast   Final Result by Deepak Daniel MD (01/25 4150)   1  Status post left nephrectomy  Right lower quadrant renal allograft is unremarkable on noncontrast CT  2   No acute inflammatory process in the abdomen or pelvis  3   3 1 cm infrarenal abdominal aortic aneurysm is minimally changed since the prior study  4   Cholelithiasis  Workstation performed: [id number]                    Procedures  Procedures       Phone Contacts  ED Phone Contact    ED Course  ED Course                                MDM  Number of Diagnoses or Management Options  Diagnosis management comments: 24-year-old female presents with a 2 week history of upper and periumbilical abdominal pain which she describes as sharp  This pain is intermittent and made worse with eating  She has had nausea but no vomiting  Decreased appetite and intermittent episodes of nonbloody diarrhea  She has had no recent travel or ill contacts  On examShe does not appear acutely ill but her mucous membranes are dry  Her abdomen is tender in the epigastric and periumbilical regions without peritoneal signs  Plan will be to obtain CBC, CMP, lipase, urinalysis  Will CT abdomen pelvis to rule out acute abdominal process such as colitis, appendicitis, diverticulitis amongst others    Will give IV fluids       Amount and/or Complexity of Data Reviewed  Clinical lab tests: ordered and reviewed  Tests in the radiology section of CPT®: ordered and reviewed  Independent visualization of images, tracings, or specimens: yes      CritCare Time    Disposition  Final diagnoses:   Dehydration   Acute renal failure (ARF) ([facility] [address] )     Time reflects when diagnosis was documented in both MDM as applicable and the Disposition within this note     Time User Action Codes Description Comment    1/25/2018 12:14 PM Neri Ibarra [E86 [id number]] Dehydration     1/25/2018 12:14 PM Neri Ibarra [N17 [id number]] Acute renal failure (ARF) [facility])       ED Disposition     ED Disposition Condition Comment    Admit  Yoshi Estrada admit to the service of Dr Papito Alonso at admission: Good        Follow-up Information    None       Patient's Medications   Discharge Prescriptions    No medications on file     No discharge procedures on file      ED Provider  Electronically Signed by           Claudette Hall DO  01/25/18 3646

## 2018-01-25 NOTE — ED NOTES
Bedside commode and umberto in room made aware of need or urine and stool sample        Medhat Gar RN  01/25/18 1455

## 2018-01-25 NOTE — PLAN OF CARE
DISCHARGE PLANNING     Discharge to home or other facility with appropriate resources Progressing        GASTROINTESTINAL - ADULT     Maintains or returns to baseline bowel function Progressing     Maintains adequate nutritional intake Progressing        METABOLIC, FLUID AND ELECTROLYTES - ADULT     Fluid balance maintained Progressing        PAIN - ADULT     Verbalizes/displays adequate comfort level or baseline comfort level Progressing        Potential for Falls     Patient will remain free of falls Progressing

## 2018-01-26 NOTE — PROGRESS NOTES
[facility] [facility] Progress Note  Patient: Mc Leach 69 y o  female   MRN: [id number]  PCP: Mari Landry DO  Unit/Bed#: [place] 2 -02 Encounter: [id number]  Date Of Visit: 01/26/18    Assessment:    Principal Problem:    Acute renal failure (ARF) ([facility] [address] )  Active Problems:    Abdominal pain    Diarrhea    Renal transplant, status post    CAD (coronary artery disease)    PAD (peripheral artery disease) ([facility])    Hypokalemia    Diabetes ([facility] )    Seizure disorder ([facility] )    Hypertension    Tobacco abuse      Plan:    · Acute renal failure in the setting of previous renal transplant on Prograf with significant improvement in azotemia overnight with IV fluids with creatinine trending from 1 63 to 1 04 will reduce IV fluids as BP is trending up and dietary intake  Think we can Re start Bumex  · Diarrhea and abdominal pain presume in relation to viral insult checking stools for enteric pathogens and C diff given proximity and postprandial symptoms and vascular history agree with mesenteric Doppler still pending she has minimal abdominal discomfort presently/CT of the abdomen pelvis was within normal limits except for previous left nephrectomy  · Renal transplant history of polycystic kidney disease and solitary kidney checking Prograf level continue Prograf continue prednisone  · Coronary artery disease stable status post CABG and stents continue aspirin, Plavix, Imdur, beta-blocker, Ranexa, statin  · Peripheral arterial disease continue Plavix statin aspirin status post S FA PTA/stent  · Check mesenteric Doppler with postprandial symptoms seizure disorder continue Keppra  · Hypokalemia has been repleted  · Tobacco abuse  refused nicotine patch  · Type 2 diabetes mellitus diet controlled      VTE Pharmacologic Prophylaxis:   Pharmacologic: Heparin  Mechanical VTE Prophylaxis in Place: Yes    Discussions with Specialists or Other Care Team Provider: no    Time Spent for Care: 30 minutes    More than 50% of total time spent on counseling and coordination of care as described above  Subjective:   States she has minimal if any abdominal pain presently has not had diarrhea since arrival no nausea vomiting tolerating diet and tolerated she is yesterday  Objective:     Vitals:   Temp (24hrs), Av 9 °F (36 1 °C), Min:96 7 °F (35 9 °C), Max:97 2 °F (36 2 °C)    HR:  [58-71] 58  Resp:  [16-18] 16  BP: (157-208)/(68-84) 158/70  SpO2:  [92 %-98 %] 98 %  Body mass index is 29 64 kg/m²  Input and Output Summary (last 24 hours): Intake/Output Summary (Last 24 hours) at 18 1338  Last data filed at 18 0758   Gross per 24 hour   Intake          1628 33 ml   Output              300 ml   Net          1328 33 ml       Physical Exam:     Physical Exam:   General appearance: alert, appears stated age and cooperative  Head: Normocephalic, without obvious abnormality, atraumatic  Lungs: clear to auscultation bilaterally  Heart: regular rate and rhythm  Abdomen: soft, non-tender; bowel sounds normal; no masses,  no organomegaly  Back: negative  Extremities: extremities normal, atraumatic, no cyanosis or edema  Neurologic: Grossly normal      Additional Data:     Labs:      Results from last 7 days  Lab Units 18  0532 18  0848   WBC Thousand/uL 7 84 11 39*   HEMOGLOBIN g/dL 15 9* 18 3*   HEMATOCRIT % 50 4* 54 4*   PLATELETS Thousands/uL 119* 104*   NEUTROS PCT %  --  61   LYMPHS PCT %  --  28   MONOS PCT %  --  10   EOS PCT %  --  1       Results from last 7 days  Lab Units 18  0532 18  0848   SODIUM mmol/L 140 141   POTASSIUM mmol/L 3 8 3 2*   CHLORIDE mmol/L 106 102   CO2 mmol/L 28 28   BUN mg/dL 13 17   CREATININE mg/dL 1 04 1 63*   CALCIUM mg/dL 9 2 9 7   TOTAL PROTEIN g/dL  --  6 5   BILIRUBIN TOTAL mg/dL  --  0 56   ALK PHOS U/L  --  51   ALT U/L  --  14   AST U/L  --  16   GLUCOSE RANDOM mg/dL 101 167*           * I Have Reviewed All Lab Data Listed Above    * Additional Pertinent Lab Tests Reviewed: All Labs For Current Hospital Admission Reviewed    Imaging:  Ct Abdomen Pelvis Wo Contrast    Result Date: 1/25/2018  Narrative: CT ABDOMEN AND PELVIS WITHOUT IV CONTRAST INDICATION:  49-year-old woman with abdominal pain  COMPARISON: Abdominal CT 5/9/2016  TECHNIQUE:  CT examination of the abdomen and pelvis was performed without intravenous contrast   Reformatted images were created in axial, sagittal, and coronal planes  Radiation dose length product (DLP) for this visit:  657 mGy-cm   This examination, like all CT scans performed in the [facility], was performed utilizing techniques to minimize radiation dose exposure, including the use of iterative reconstruction and automated exposure control  Enteric contrast was administered  FINDINGS: ABDOMEN LOWER CHEST:  No significant abnormalities identified in the lower chest  LIVER/BILIARY TREE:  One or more subcentimeter sharply circumscribed low-density hepatic lesion(s) are noted, too small to accurately characterize, but statistically most likely to represent subcentimeter hepatic cysts  Hepatic contours are normal   No  biliary dilatation  GALLBLADDER:  There are gallstone(s) within the gallbladder, without pericholecystic inflammatory changes  SPLEEN:  Unremarkable  PANCREAS:  Unremarkable  ADRENAL GLANDS:  Unremarkable  KIDNEYS/URETERS:  Status post left nephrectomy  Right polycystic kidney disease  Right lower quadrant renal allograft is unremarkable on noncontrast CT  No hydronephrosis  STOMACH AND BOWEL:  Unremarkable  APPENDIX:  A normal appendix was visualized  ABDOMINOPELVIC CAVITY:  No ascites or free intraperitoneal air  No lymphadenopathy  VESSELS:  Aortic atherosclerosis  3 1 cm infrarenal abdominal aortic aneurysm (series 602 image 74) is minimally changed since the prior study  PELVIS REPRODUCTIVE ORGANS:  Unremarkable for patient's age  URINARY BLADDER:  Unremarkable   ABDOMINAL WALL/INGUINAL REGIONS: Unremarkable  OSSEOUS STRUCTURES:  No acute fracture or destructive osseous lesion  Impression: 1  Status post left nephrectomy  Right lower quadrant renal allograft is unremarkable on noncontrast CT  2   No acute inflammatory process in the abdomen or pelvis  3   3 1 cm infrarenal abdominal aortic aneurysm is minimally changed since the prior study  4   Cholelithiasis  Workstation performed: [id number]     Imaging Reports Reviewed Today Include:  Reviewed CT abdomen results  Imaging Personally Reviewed by Myself Includes:    Procedure: Ct Abdomen Pelvis Wo Contrast    Result Date: 1/25/2018  Narrative: CT ABDOMEN AND PELVIS WITHOUT IV CONTRAST INDICATION:  18-year-old woman with abdominal pain  COMPARISON: Abdominal CT 5/9/2016  TECHNIQUE:  CT examination of the abdomen and pelvis was performed without intravenous contrast   Reformatted images were created in axial, sagittal, and coronal planes  Radiation dose length product (DLP) for this visit:  657 mGy-cm   This examination, like all CT scans performed in the [facility], was performed utilizing techniques to minimize radiation dose exposure, including the use of iterative reconstruction and automated exposure control  Enteric contrast was administered  FINDINGS: ABDOMEN LOWER CHEST:  No significant abnormalities identified in the lower chest  LIVER/BILIARY TREE:  One or more subcentimeter sharply circumscribed low-density hepatic lesion(s) are noted, too small to accurately characterize, but statistically most likely to represent subcentimeter hepatic cysts  Hepatic contours are normal   No  biliary dilatation  GALLBLADDER:  There are gallstone(s) within the gallbladder, without pericholecystic inflammatory changes  SPLEEN:  Unremarkable  PANCREAS:  Unremarkable  ADRENAL GLANDS:  Unremarkable  KIDNEYS/URETERS:  Status post left nephrectomy  Right polycystic kidney disease    Right lower quadrant renal allograft is unremarkable on noncontrast CT  No hydronephrosis  STOMACH AND BOWEL:  Unremarkable  APPENDIX:  A normal appendix was visualized  ABDOMINOPELVIC CAVITY:  No ascites or free intraperitoneal air  No lymphadenopathy  VESSELS:  Aortic atherosclerosis  3 1 cm infrarenal abdominal aortic aneurysm (series 602 image 74) is minimally changed since the prior study  PELVIS REPRODUCTIVE ORGANS:  Unremarkable for patient's age  URINARY BLADDER:  Unremarkable  ABDOMINAL WALL/INGUINAL REGIONS:  Unremarkable  OSSEOUS STRUCTURES:  No acute fracture or destructive osseous lesion  Impression: 1  Status post left nephrectomy  Right lower quadrant renal allograft is unremarkable on noncontrast CT  2   No acute inflammatory process in the abdomen or pelvis  3   3 1 cm infrarenal abdominal aortic aneurysm is minimally changed since the prior study  4   Cholelithiasis  Workstation performed: [id number]        Recent Cultures (last 7 days):           Last 24 Hours Medication List:     aspirin 81 mg Oral QPM   calcium carbonate 1 tablet Oral Daily With Breakfast   clonazePAM 0 5 mg Oral QPM   clopidogrel 75 mg Oral Daily   divalproex sodium 500 mg Oral BID   DULoxetine 30 mg Oral Daily   fish oil 1,000 mg Oral Daily   FLUoxetine 20 mg Oral Daily   folic acid-pyridoxine-cyanocobalamin 1 tablet Oral Daily   heparin (porcine) 5,000 Units Subcutaneous Q8H [address]   isosorbide mononitrate 30 mg Oral Daily   magnesium oxide 400 mg Oral Daily   metoprolol tartrate 25 mg Oral BID   mycophenolate 360 mg Oral BID   pantoprazole 40 mg Oral Early Morning   pravastatin 80 mg Oral Daily With Dinner   predniSONE 5 mg Oral Daily   ranolazine 500 mg Oral Q12H   tacrolimus 1 mg Oral BID        Today, Patient Was Seen By: Trang Vazquez MD    ** Please Note: Dragon 360 Dictation voice to text software may have been used in the creation of this document   **

## 2018-01-26 NOTE — CASE MANAGEMENT
Initial Clinical Review    Admission: Date/Time/Statement: 1/25/18 @ 1216     Orders Placed This Encounter   Procedures    Inpatient Admission (expected length of stay for this patient is greater than two midnights)     Standing Status:   Standing     Number of Occurrences:   1     Order Specific Question:   Admitting Physician     Answer:   Janette Bishop [[id number]]     Order Specific Question:   Level of Care     Answer:   Med Surg [[id number]]     Order Specific Question:   Estimated length of stay     Answer:   More than 2 Midnights     Order Specific Question:   Certification     Answer:   I certify that inpatient services are medically necessary for this patient for a duration of greater than two midnights  See H&P and MD Progress Notes for additional information about the patient's course of treatment  ED: Date/Time/Mode of Arrival:   ED Arrival Information     Expected Arrival Acuity Means of Arrival Escorted By Service Admission Type    - 1/25/2018 08:10 Urgent Walk-In Family Member General Medicine Urgent    Arrival Complaint    Abd pain          Chief Complaint:   Chief Complaint   Patient presents with    Abdominal Pain     Reports generalized abdominal pain, n/v/d, and generalized weakness that started two weeks ago  History of Illness:   Ellie Maddox is a 69 y o  female with a history of renal transplant secondary to polycystic solitary kidney, CAD s/p CABG/stents, PAD, diet-controlled diabetes, and tobacco abuse who presents with abdominal pain and diarrhea  Symptoms have been present x 2 weeks  She reports severe, generalized abdominal pain only after eating  She also reports non-bloody diarrhea x 2 weeks  Having 2-3 episodes daily  No fevers at home  No nausea or vomiting  Denies recent antibiotic use, hospitalization, travel  Never had symptoms like this before  Due to pain, she has not been eating much      On admission, CT abdomen/pelvis was done which was negative   Her creatinine was elevated at 1 63        ED Vital Signs:   ED Triage Vitals   Temperature Pulse Respirations Blood Pressure SpO2   01/25/18 0820 01/25/18 0819 01/25/18 0819 01/25/18 0819 01/25/18 0819   98 1 °F (36 7 °C) 85 18 (!) 190/113 98 %      Temp Source Heart Rate Source Patient Position - Orthostatic VS BP Location FiO2 (%)   01/25/18 0820 01/25/18 1233 01/25/18 1233 01/25/18 1233 --   Oral Monitor Lying Left arm       Pain Score       01/25/18 0819       No Pain        Wt Readings from Last 1 Encounters:   01/25/18 75 9 kg (167 lb 5 3 oz)       Vital Signs (abnormal):   above    Abnormal Labs/Diagnostic Test Results:    Valproic  Acid  13  Creat   1 63  K  3 2  Albumin  2 9  Lipase  63  WBC   11 39  H/H  18 3/54 4  Ct  Abd/pelvis:   Status post left nephrectomy   Right lower quadrant renal allograft is unremarkable on noncontrast CT  2   No acute inflammatory process in the abdomen or pelvis  3   3 1 cm infrarenal abdominal aortic aneurysm is minimally changed since the prior study  4   Cholelithiasis  ED Treatment:   Medication Administration from 01/25/2018 0810 to 01/25/2018 1435       Date/Time Order Dose Route Action Action by Comments     01/25/2018 1144 sodium chloride 0 9 % bolus 1,000 mL 0 mL Intravenous Stopped Isatu Barba      01/25/2018 0854 sodium chloride 0 9 % bolus 1,000 mL 1,000 mL Intravenous Archie 37 Jumana Ohara RN      01/25/2018 1025 potassium chloride (K-DUR,KLOR-CON) CR tablet 40 mEq 40 mEq Oral Given Jerry Murphy RN      01/25/2018 1107 iohexol (OMNIPAQUE) 240 MG/ML solution 50 mL 50 mL Oral Given Kaushal Carbine      01/25/2018 1357 sodium chloride 0 9 % infusion 250 mL/hr Intravenous New Bag Ninette Gilford, RN           Past Medical/Surgical History:    Active Ambulatory Problems     Diagnosis Date Noted    Arteriosclerosis of left carotid artery      Resolved Ambulatory Problems     Diagnosis Date Noted    No Resolved Ambulatory Problems     Past Medical History: Diagnosis Date    Anxiety     Arteriosclerosis of left carotid artery     CAD (coronary artery disease)     Cardiac disorder     Carotid artery stenosis     Cataract     Chronic kidney disease     Current chronic use of systemic steroids     Depression     Diabetes mellitus ([facility] [address] )     Headache     Heart failure (HCC)     Hyperlipidemia     Hypertension     Insomnia     Kidney transplant complication     Migraine     Nicotine dependence     Perforation of left tympanic membrane        Admitting Diagnosis: Dehydration [E86 0]  Abdominal pain [R10 9]  Acute renal failure (ARF) (HCC) [N17 9]    Age/Sex: 69 y o  female    · Assessment/Plan: MARKUS with history of renal transplant:  ? Suspect pre-renal given poor oral intake, GI loss  ? IVF hydration  ? Hold diuretic  ? Avoid nephrotoxins  ? Trend creatinine  ? Check Prograf level  · Diarrhea/abdominal pain:  ? CT abdomen negative  ? IVF hydration  ? F/U stool studies  ? Given post-prandial symptoms and known vascular disease, check mesenteric duplex     Plan for Additional Problems:   · Hx polycystic kidney/solitary kidney, s/p right renal transplant:  ? Continue anti-rejection meds  ? Check Prograf level  · CAD:  ? S/p CABG, stents  ? Continue ASA, Plavix, Imdur, BB, Ranexa, statin  · PAD:  ? S/p L SFA PTA/stent  ? Continue ASA, Plavix, statin  Hypokalemia:  · Type 2 DM:  ? Diet-controlled  · Seizure disorder:  ? Keppra  · HTN:  ? BPs uncontrolled  ? Continue home meds, PRN hydralazine  Adjustments as needed  · Tobacco abuse:  ? Patient declines nicotine patch     VTE Prophylaxis: Heparin  / sequential compression device   Code Status: Full code  POLST: There is no POLST form on file for this patient (pre-hospital)     Anticipated Length of Stay:  Patient will be admitted on an Inpatient basis with an anticipated length of stay of  > 2 midnights     Justification for Hospital Stay: MARKUS in patient with renal transplant, workup of diarrhea/abdominal pain        Admission Orders:   IP   1/25  @  1216  Scheduled Meds:   aspirin 81 mg Oral QPM   calcium carbonate 1 tablet Oral Daily With Breakfast   clonazePAM 0 5 mg Oral QPM   clopidogrel 75 mg Oral Daily   divalproex sodium 500 mg Oral BID   DULoxetine 30 mg Oral Daily   fish oil 1,000 mg Oral Daily   FLUoxetine 20 mg Oral Daily   folic acid-pyridoxine-cyanocobalamin 1 tablet Oral Daily   heparin (porcine) 5,000 Units Subcutaneous Q8H [address]   isosorbide mononitrate 30 mg Oral Daily   magnesium oxide 400 mg Oral Daily   metoprolol tartrate 25 mg Oral BID   mycophenolate 360 mg Oral BID   pantoprazole 40 mg Oral Early Morning   pravastatin 80 mg Oral Daily With Dinner   predniSONE 5 mg Oral Daily   ranolazine 500 mg Oral Q12H   tacrolimus 1 mg Oral BID     Continuous Infusions:   sodium chloride 100 mL/hr Last Rate: 100 mL/hr (01/26/18 0759)     PRN Meds:   acetaminophen    calcium carbonate    hydrALAZINE    ondansetron    CCD diet    Thank you,  [facility] in the [facility] by [address] for 2017  Network Utilization Review Department  Phone: [phone]; Fax [phone]  ATTENTION: The Network Utilization Review Department is now centralized for our 7 Facilities  Make a note that we have a new phone and fax numbers for our Department  Please call with any questions or concerns to [phone] and carefully follow the prompts so that you are directed to the right person  All voicemails are confidential  Fax any determinations, approvals, denials, and requests for initial or continue stay review clinical to [phone]  Due to HIGH CALL volume, it would be easier if you could please send faxed requests to expedite your requests and in part, help us provide discharge notifications faster

## 2018-01-27 NOTE — PROGRESS NOTES
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  Vascular consult  4  Polycystic kidney disease - s/p renal transplant  Ct home meds  [place] in am - [phone] - after vascular eval  5  Type 2 diabetes - diet controlled  6   Tobacco abuse - refused nicotine patch  Smoking cessation advise  7  CAD - s/p CABG - - ct ASA, Plavix, statins, Imdur  8  Seizure disorder - continue Depakote    VTE Pharmacologic Prophylaxis:   Pharmacologic: Heparin  Mechanical: Mechanical VTE prophylaxis in place  Discussions with Specialists or Other Care Team Provider: Discussed with RN    Education and Discussions with Family / Patient: Discussed with  at the bedside    Time Spent for Care: 20 minutes  More than 50% of total time spent on counseling and coordination of care as described above  Current Length of Stay: 2 day(s)    Current Patient Status: Inpatient   Certification Statement: The patient will continue to require additional inpatient hospital stay due to SMA stenosis requiring vascular evaluation    Code Status: Level 1 - Full Code    Subjective:   No abdominal pain or diarrhea  No nausea or vomiting  Feeds well  Objective:     Vitals:   Temp (24hrs), Av 7 °F (36 5 °C), Min:97 1 °F (36 2 °C), Max:98 1 °F (36 7 °C)    HR:  [62-64] 62  Resp:  [16-18] 18  BP: (158-174)/(68-73) 158/68  SpO2:  [92 %-96 %] 92 %  Body mass index is 29 64 kg/m²       Physical Exam: Physical Exam   Constitutional: She is oriented to person, place, and time  HENT:   Head: Atraumatic  Eyes: EOM are normal    Neck: Neck supple  No JVD present  No tracheal deviation present  No thyromegaly present  Cardiovascular: Normal rate, regular rhythm and normal heart sounds  Pulmonary/Chest: Effort normal and breath sounds normal  No respiratory distress  She has no wheezes  She has no rales  Abdominal: Soft  Bowel sounds are normal  She exhibits no distension  There is no tenderness  There is no rebound  Musculoskeletal: She exhibits no edema  Neurological: She is alert and oriented to person, place, and time  Skin: Skin is warm and dry  Psychiatric: She has a normal mood and affect  Additional Data:     Labs:      Results from last 7 days  Lab Units 01/26/18  0532 01/25/18  0848   WBC Thousand/uL 7 84 11 39*   HEMOGLOBIN g/dL 15 9* 18 3*   HEMATOCRIT % 50 4* 54 4*   PLATELETS Thousands/uL 119* 104*   NEUTROS PCT %  --  61   LYMPHS PCT %  --  28   MONOS PCT %  --  10   EOS PCT %  --  1       Results from last 7 days  Lab Units 01/27/18  1318  01/25/18  0848   SODIUM mmol/L 143  < > 141   POTASSIUM mmol/L 3 6  < > 3 2*   CHLORIDE mmol/L 109*  < > 102   CO2 mmol/L 26  < > 28   BUN mg/dL 10  < > 17   CREATININE mg/dL 1 23  < > 1 63*   CALCIUM mg/dL 8 7  < > 9 7   TOTAL PROTEIN g/dL  --   --  6 5   BILIRUBIN TOTAL mg/dL  --   --  0 56   ALK PHOS U/L  --   --  51   ALT U/L  --   --  14   AST U/L  --   --  16   GLUCOSE RANDOM mg/dL 125  < > 167*   < > = values in this interval not displayed  * I Have Reviewed All Lab Data Listed Above  * Additional Pertinent Lab Tests Reviewed:  [place] Admission Reviewed      Last 24 Hours Medication List:     aspirin 81 mg Oral QPM   calcium carbonate 1 tablet Oral Daily With Breakfast   clonazePAM 0 5 mg Oral QPM   clopidogrel 75 mg Oral Daily   divalproex sodium 500 mg Oral BID   DULoxetine 30 mg Oral Daily   fish oil 1,000 mg Oral Daily   FLUoxetine 20 mg Oral Daily   folic acid-pyridoxine-cyanocobalamin 1 tablet Oral Daily   heparin (porcine) 5,000 Units Subcutaneous Q8H [address]   isosorbide mononitrate 30 mg Oral Daily   magnesium oxide 400 mg Oral Daily   metoprolol tartrate 25 mg Oral BID   mycophenolate 360 mg Oral BID   pantoprazole 40 mg Oral Early Morning   pravastatin 80 mg Oral Daily With Dinner   predniSONE 5 mg Oral Daily   ranolazine 500 mg Oral Q12H   tacrolimus 1 mg Oral BID        Today, Patient Was Seen By: Tabitha Montoya MD    ** Please Note: Dragon 360 Dictation voice to text software may have been used in the creation of this document   **

## 2018-01-28 PROBLEM — N17.9 ACUTE RENAL FAILURE (ARF) (HCC): Status: RESOLVED | Noted: 2018-01-01 | Resolved: 2018-01-01

## 2018-01-28 NOTE — CONSULTS
Consult Note - Vascular Surgery   Gigi Alarcon 69 y o  female MRN: [id number]    Unit/Bed#: [place] [address] -02 Encounter: [id number]    Assessment:  Diarrhea, abdominal pain consistent with viral gastroenteritis  MARKUS, resolved  Asymptomatic SMA, celiac stenosis  Asymptomatic 3 1 cm infrarenal AAA  PAD, s/p L SFA PTA/stent 1/3/17  Hx L common carotid artery stenosis, s/p L CCA stent 3/24/2016  Polycystic kidney disease, s/p renal transplantation  HTN  HLD  Type II DM  CAD, s/p CABG  COPD  Hx nicotine dependence (48 p/y)    Plan:  --d/w Dr Blas Hansen  --incidental finding of asymptomatic SMA and celiac stenosis  Symptoms not consistent with mesenteric ischemia  --no vascular intervention indicated  --continue aspirin, Plavix, statin  --smoking cessation  --outpatient follow-up with Dr Loreta Soria for SMA/celiac stenosis and known AAA, carotid disease and PAD  Patient due for carotid duplex and TALIB  Will obtain all studies and return to office to discuss all vascular disease and surveillance  Discussed at length with patient and  at the bedside  --d/w SLIM  Thank you for allowing us to participate in the care of this patient  Consulting Service: SLIM    Chief Complaint:   Nausea, vomiting, epigastric abdominal pain x 2 weeks      HPI: Gigi Alarcon is a 69 y o  female smoker followed by Dr Loreta Soria with history of HTN, HLD, type II DM, COPD, CAD s/p CABG, polycystic kidney disease, s/p renal transplantation, s/p L common carotid artery stent 3/24/2016, PAD s/p L SFA stent 1/3/17 by Dr Loreta Soria and known 3 1 cm infrarenal AAA who presents with 2 week history of nausea, vomiting and epigastric discomfort  Patient states her epigastric discomfort was exacerbated by food but has no chronic history of postprandial abdominal pain  She denies food fear or unexplained weight loss  Due to dehydration, the patient also had MARKUS on admission    Patient's symptoms has resolved and ultimately she has been diagnosed with a viral gastroenteritis  Noncontrast CT of the abdomen pelvis 1/25/2018 demonstrated stability of her known 3 1 cm infrarenal AAA  Mesenteric duplex on 1/26/2018 has been reviewed with Dr Francis Montero and demonstrates her known infrarenal 3 1 cm AAA as well as > 70% stenosis of the SMA and celiac artery with associated SMA velocity 460/90 and celiac artery velocity 251/43  Of note, on prior abdominal aortogram 1/3/2017 the SMA, celiac trunk, MERA and abdominal aorta were patent  Vascular Surgery is being consulted secondary to SMA and celiac artery stenosis       Review of Systems:  General: negative  Cardiovascular: no chest pain or dyspnea on exertion  Respiratory: no cough, shortness of breath, or wheezing  Gastrointestinal:   As per HPI  Genitourinary ROS: no dysuria, trouble voiding, or hematuria  Musculoskeletal ROS: negative  Neurological ROS: no TIA or stroke symptoms  Hematological and Lymphatic ROS: negative  Dermatological ROS: negative  Psychological ROS: negative  Ophthalmic ROS: negative  ENT ROS: negative    Past Medical History:  Past Medical History:   Diagnosis Date    Anxiety     Arteriosclerosis of left carotid artery     RESOLVED: [id number]    CAD (coronary artery disease)     Cardiac disorder     Carotid artery stenosis     Cataract     RESOLVED: [id number]    Chronic kidney disease     Current chronic use of systemic steroids     RESOLVED: [id number]    Depression     Diabetes mellitus (HCC)     Headache     Heart failure (HCC)     Hyperlipidemia     Hypertension     Insomnia     Kidney transplant complication     Migraine     RESOLVED: [id number]    Nicotine dependence     Perforation of left tympanic membrane        Past Surgical History:  Past Surgical History:   Procedure Laterality Date    ARTERIOGRAM Left 3/24/2016    Procedure: AORTIC ARCH AORTOGRAM/CEREBRAL ANGIOGRAM ;  Surgeon: Aby Obrien DO;  Location: [facility];  Service:     BREAST BIOPSY      OPEN; ONSET: 2006    CARDIAC CATHETERIZATION      OUTCOME: SUCCESSFUL; ONSET: AUG2008    CAROTID ARTERY ANGIOPLASTY Left     managed by:Bernardo Jaimes;  onset: 24Mar2016    CAROTID STENT Left 3/24/2016    Procedure: COMMON CAROTID ARTERY STENT PLACEMENT ;  Surgeon: Nicole Bob DO;  [facility];  Service:     CATARACT EXTRACTION W/  INTRAOCULAR LENS IMPLANT Bilateral     CORONARY ANGIOPLASTY      onset: 2006    CORONARY ANGIOPLASTY WITH STENT PLACEMENT      1 INITIAL    CORONARY ARTERY BYPASS GRAFT      X 3; ONSET: 1998    CORONARY STENT PLACEMENT      NEPHRECTOMY TRANSPLANTED ORGAN      OTHER SURGICAL HISTORY      EMG Dynamic surface, 1-12 muscles; onset: [id number]    TRANSLUMINAL ANGIOPLASTY Left     intraoperative; LEFT SFA ANGIOPLAST; LEFT SFA SELF EXPANDING STENT; Onset: [id number]    TRANSPLANTATION RENAL      LAST ASSESSED: [id number]       Social History:  History   Alcohol Use No     History   Drug Use No     History   Smoking Status    Current Every Day Smoker    Packs/day: 1 00    Years: 50 00   Smokeless Tobacco    Never Used       Family History:  Family History   Problem Relation Age of Onset    Coronary artery disease Mother     Gout Mother     Hypertension Mother     Cancer Father     Coronary artery disease Family     Diabetes Family     Polycystic kidney disease Family        Allergies:  No Known Allergies    Medications:  Current Facility-Administered Medications   Medication Dose Route Frequency    acetaminophen (TYLENOL) tablet 650 mg  650 mg Oral Q6H PRN    aspirin chewable tablet 81 mg  81 mg Oral QPM    calcium carbonate (OYSTER SHELL,OSCAL) 500 mg tablet 1 tablet  1 tablet Oral Daily With Breakfast    calcium carbonate (TUMS) chewable tablet 1,000 mg  1,000 mg Oral Daily PRN    clonazePAM (KlonoPIN) tablet 0 5 mg  0 5 mg Oral QPM    clopidogrel (PLAVIX) tablet 75 mg  75 mg Oral Daily    divalproex sodium (DEPAKOTE) EC tablet 500 mg  500 mg Oral BID    DULoxetine (CYMBALTA) delayed release capsule 30 mg  30 mg Oral Daily    fish oil capsule 1,000 mg  1,000 mg Oral Daily    FLUoxetine (PROzac) capsule 20 mg  20 mg Oral Daily    folic acid-pyridoxine-cyanocobalamin 2 5-25-2 mg per tablet 1 tablet  1 tablet Oral Daily    heparin (porcine) subcutaneous injection 5,000 Units  5,000 Units Subcutaneous Q8H [address]    hydrALAZINE (APRESOLINE) injection 5 mg  5 mg Intravenous Q6H PRN    isosorbide mononitrate (IMDUR) 24 hr tablet 30 mg  30 mg Oral Daily    magnesium oxide (MAG-OX) tablet 400 mg  400 mg Oral Daily    metoprolol tartrate (LOPRESSOR) tablet 25 mg  25 mg Oral BID    mycophenolic acid (MYFORTIC) EC tablet 360 mg  360 mg Oral BID    ondansetron (ZOFRAN) injection 4 mg  4 mg Intravenous Q6H PRN    pantoprazole (PROTONIX) EC tablet 40 mg  40 mg Oral Early Morning    pravastatin (PRAVACHOL) tablet 80 mg  80 mg Oral Daily With Dinner    predniSONE tablet 5 mg  5 mg Oral Daily    ranolazine (RANEXA) 12 hr tablet 500 mg  500 mg Oral Q12H    sodium chloride 0 9 % infusion  50 mL/hr Intravenous Continuous    tacrolimus (PROGRAF) capsule 1 mg  1 mg Oral BID       Vitals:  /62 (BP Location: Left arm)   Pulse (!) 54   Temp 98 °F (36 7 °C) (Tympanic)   Resp 16   Wt 75 9 kg (167 lb 5 3 oz)   LMP  (LMP Unknown)   SpO2 92%   BMI 29 64 kg/m²     I/Os:  I/O last 24 hours: In: 1585 [P O :240;  I V :980]  Out: -     Lab Results and Cultures:   Lab Results   Component Value Date    WBC 7 84 01/26/2018    HGB 15 9 (H) 01/26/2018    HCT 50 4 (H) 01/26/2018     (H) 01/26/2018     (L) 01/26/2018     Lab Results   Component Value Date    GLUCOSE 103 01/28/2018    CALCIUM 8 6 01/28/2018     01/28/2018    K 3 7 01/28/2018    CO2 26 01/28/2018     01/28/2018    BUN 9 01/28/2018    CREATININE 1 04 01/28/2018     Lab Results   Component Value Date    INR 1 03 03/21/2016    PROTIME 13 3 03/21/2016       Lipid Panel: No results found for: CHOL,     Blood Culture: No results found for: BLOODCX,   Urinalysis: No results found for: Naaman Gowers, SPECGRAV, PHUR, LEUKOCYTESUR, NITRITE, PROTEINUA, GLUCOSEU, KETONESU, BILIRUBINUR, BLOODU,   Urine Culture: No results found for: URINECX,   Wound Culure: No results found for: WOUNDCULT    Imaging:  Mesenteric Dopplers 1/26/2018:  3 1 cm infrarenal AAA  > 70% stenosis of SMA and celiac trunk  SMA velocity 460/90  Celiac velocity 251/43  Full report as noted below:  "FINDINGS:   Unilateral     Impression  PSV  EDV  AP Diam  TRV Diam    Sup-Magdalena Ao                  50    7                       Celiac                     251   43                       Prox  SMA                  460   90                       Px Inf-Roberto Ao  Aneurysm     64    0      3 1       3 1    Mid  SMA                    83   24                         CONCLUSION:  Impression  There is an infra-renal abdominal aortic aneurysm, measuring approximately 3 1  cm  (AP) by 3 1 cm  (TRV)  Findings suggest a >70% stenosis in the visualized portions of the superior  mesenteric artery and visualized portion of the celiac artery  However, this study is very technically limited, difficult due to abundant  abdominal bowel gas and patient tenderness with probe pressure, and patient body  habitus  The inferior mesenteric artery could not be identified "     Noncontrast CT abdomen pelvis 1/25/2018:  Stability of known 3 1 cm infrarenal AAA without gross evidence of rupture, extravasation or dissection      Abdominal aortogram with left lower extremity runoff 1/3/2017 ([facility]):  Patent SMA, celiac trunk, MERA and abdominal aorta    Physical Exam:    General appearance: alert and oriented, in no acute distress  Skin: Skin color, texture, turgor normal  No rashes or lesions  Neurologic: Grossly normal  Head: Normocephalic, without obvious abnormality, atraumatic  Eyes: PERRL, EOMI, sclerae nonicteric  Throat: lips, mucosa, and tongue normal; teeth and gums normal  Neck: no adenopathy, no carotid bruit, no JVD, supple, symmetrical, trachea midline and thyroid not enlarged, symmetric, no tenderness/mass/nodules  Back: symmetric, no curvature  ROM normal  No CVA tenderness  Lungs: clear to auscultation bilaterally  Chest wall: no tenderness  Heart: regular rate and rhythm, S1, S2 normal, no murmur, click, rub or gallop  Abdomen: soft, non-tender; bowel sounds normal; no masses,  no organomegaly and Nondistended  No abdominal bruits    Extremities: extremities normal, warm and well-perfused; no cyanosis, clubbing, or edema, no edema, redness or tenderness in the calves or thighs and no ulcers, gangrene or trophic changes    Pulse exam:  Radial: Right: 2+ Left[de-identified] 2+  Femoral: Right: 2+ Left: 2+  DP: Right: 2+ Left: 2+      Cynthia Arciniega PA-C  1/28/2018  [facility], [phone]

## 2018-01-28 NOTE — NURSING NOTE
All discharge instructions and follow-up appointments were explained to the patient and spouse  Patient took all belongings with when discharged

## 2018-01-28 NOTE — PROGRESS NOTES
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 - discussed with vascular - no vascular intervention required  Will follow-up as outpatient with her primary vascular surgeon Dr Brian Chong for surveillance  Ct ASA, Plavix, statins  4  Polycystic kidney disease - s/p renal transplant  Ct home meds  Discussed with her primary transplant specialist Dr Shelbi Christiansen on the phone  5  Type 2 diabetes - diet controlled  6   Tobacco abuse - refused nicotine patch  Smoking cessation advise  7  CAD - s/p CABG - - ct ASA, Plavix, statins, Imdur  8  Seizure disorder - continue Depakote  9  PAD - s/p left SFA PTA/stent on 1/3/17  10  H/o left common carotid artery stenosis - s/p left CCA stent on 3/24/16  11  Depression/anxiety - stable  Ct home meds    VTE Pharmacologic Prophylaxis:   Pharmacologic: Heparin  Mechanical: Mechanical VTE prophylaxis in place  Discussions with Specialists or Other Care Team Provider: Discussed with transplant physician Dr Shelbi Christiansen, [address] [place] [address] at [phone]  Discussed with vascular surgery    Education and Discussions with Family / Patient: Discussed with  at the bedside    Time Spent for Care: 20 minutes  More than 50% of total time spent on counseling and coordination of care as described above      Current Length of Stay: 3 day(s)    Current Patient Status: Inpatient     Discharge Plan: Discharge home today    Code Status: Level 1 - Full Code    Subjective:   No further abdominal pain or diarrhea  Feels well  Objective:     Vitals:   Temp (24hrs), Av 8 °F (36 6 °C), Min:97 3 °F (36 3 °C), Max:98 1 °F (36 7 °C)    HR:  [54-72] 54  Resp:  [16-18] 16  BP: (142-171)/(62-74) 142/62  SpO2:  [91 %-93 %] 92 %  Body mass index is 29 64 kg/m²  Physical Exam:     Physical Exam   Constitutional: She is oriented to person, place, and time  HENT:   Head: Atraumatic  Eyes: EOM are normal    Neck: Neck supple  No JVD present  No tracheal deviation present  No thyromegaly present  Cardiovascular: Normal rate, regular rhythm and normal heart sounds  Pulmonary/Chest: Effort normal and breath sounds normal  No respiratory distress  She has no wheezes  She has no rales  Abdominal: Soft  Bowel sounds are normal  She exhibits no distension  There is no tenderness  There is no rebound  Musculoskeletal: She exhibits no edema  Neurological: She is alert and oriented to person, place, and time  Skin: Skin is warm and dry  Psychiatric: She has a normal mood and affect  Additional Data:     Labs:      Results from last 7 days  Lab Units 18  0532 18  0848   WBC Thousand/uL 7 84 11 39*   HEMOGLOBIN g/dL 15 9* 18 3*   HEMATOCRIT % 50 4* 54 4*   PLATELETS Thousands/uL 119* 104*   NEUTROS PCT %  --  61   LYMPHS PCT %  --  28   MONOS PCT %  --  10   EOS PCT %  --  1       Results from last 7 days  Lab Units 18  0610  18  0848   SODIUM mmol/L 140  < > 141   POTASSIUM mmol/L 3 7  < > 3 2*   CHLORIDE mmol/L 107  < > 102   CO2 mmol/L 26  < > 28   BUN mg/dL 9  < > 17   CREATININE mg/dL 1 04  < > 1 63*   CALCIUM mg/dL 8 6  < > 9 7   TOTAL PROTEIN g/dL  --   --  6 5   BILIRUBIN TOTAL mg/dL  --   --  0 56   ALK PHOS U/L  --   --  51   ALT U/L  --   --  14   AST U/L  --   --  16   GLUCOSE RANDOM mg/dL 103  < > 167*   < > = values in this interval not displayed  * I Have Reviewed All Lab Data Listed Above  * Additional Pertinent Lab Tests Reviewed:  All Labs For Current Hospital Admission Reviewed      Last 24 Hours Medication List:     aspirin 81 mg Oral QPM   calcium carbonate 1 tablet Oral Daily With Breakfast   clonazePAM 0 5 mg Oral QPM   clopidogrel 75 mg Oral Daily   divalproex sodium 500 mg Oral BID   DULoxetine 30 mg Oral Daily   fish oil 1,000 mg Oral Daily   FLUoxetine 20 mg Oral Daily   folic acid-pyridoxine-cyanocobalamin 1 tablet Oral Daily   heparin (porcine) 5,000 Units Subcutaneous Q8H [address]   isosorbide mononitrate 30 mg Oral Daily   magnesium oxide 400 mg Oral Daily   metoprolol tartrate 25 mg Oral BID   mycophenolate 360 mg Oral BID   pantoprazole 40 mg Oral Early Morning   pravastatin 80 mg Oral Daily With Dinner   predniSONE 5 mg Oral Daily   ranolazine 500 mg Oral Q12H   tacrolimus 1 mg Oral BID        Today, Patient Was Seen By: Neo Vitale MD    ** Please Note: Dragon 360 Dictation voice to text software may have been used in the creation of this document   **

## 2018-01-29 NOTE — CASE MANAGEMENT
Notification of Discharge  This is a Notification of Discharge from our facility [address]  Please be advised that this patient has been discharge from our facility  Below you will find the admission and discharge date and time including the patients disposition  PRESENTATION DATE: 1/25/2018  8:18 AM  IP ADMISSION DATE: 1/25/18 1216  DISCHARGE DATE: 1/28/2018  3:38 PM  DISPOSITION: [address] [place] [address] in the [facility] by Charles Guan for 2017  Network Utilization Review Department  Phone: [phone]; Fax [phone]  ATTENTION: The Network Utilization Review Department is now centralized for our 7 Facilities  Make a note that we have a new phone and fax numbers for our Department  Please call with any questions or concerns to [phone] and carefully follow the prompts so that you are directed to the right person  All voicemails are confidential  Fax any determinations, approvals, denials, and requests for initial or continue stay review clinical to [phone]  Due to HIGH CALL volume, it would be easier if you could please send faxed requests to expedite your requests and in part, help us provide discharge notifications faster

## 2018-01-30 NOTE — CASE MANAGEMENT
Continued Stay Review      For   1/26  And   1/27    Date:    1/30/2018    Vital Signs: /62 (BP Location: Left arm)   Pulse (!) 54   Temp 98 °F (36 7 °C) (Tympanic)   Resp 16   Wt 75 9 kg (167 lb 5 3 oz)   LMP  (LMP Unknown)   SpO2 92%   BMI 29 64 kg/m²     Medications:   Scheduled Meds:   Continuous Infusions:   No current facility-administered medications for this encounter  PRN Meds:     Abnormal Labs/Diagnostic Results:   Abnl   Labs   1/26  H/H   15 9/50 4    Abnl labs  1/27  Chloride    109    Age/Sex: 69 y o  female     Assessment/Plan:   PROGRESS  NOTE   1/26  · Acute renal failure in the setting of previous renal transplant on Prograf with significant improvement in azotemia overnight with IV fluids with creatinine trending from 1 63 to 1 04 will reduce IV fluids as BP is trending up and dietary intake    Think we can Re start Bumex  · Diarrhea and abdominal pain presume in relation to viral insult checking stools for enteric pathogens and C diff given proximity and postprandial symptoms and vascular history agree with mesenteric Doppler still pending she has minimal abdominal discomfort presently/CT of the abdomen pelvis was within normal limits except for previous left nephrectomy  · Renal transplant history of polycystic kidney disease and solitary kidney checking Prograf level continue Prograf continue prednisone  · Coronary artery disease stable status post CABG and stents continue aspirin, Plavix, Imdur, beta-blocker, Ranexa, statin  · Peripheral arterial disease continue Plavix statin aspirin status post S FA PTA/stent  · Check mesenteric Doppler with postprandial symptoms seizure disorder continue Keppra  · Hypokalemia has been repleted  · Tobacco abuse  refused nicotine patch  · Type 2 diabetes mellitus diet controlled    meds   1/26  ASA  Daily  depakote   BID  prozac  Daily  Klonopin  HS  cymbalta   Daily  plavix daily  SQ  Heparin  Q 8 hrs  protonix  Daily  Lopressor BID  imdur   Daily  Prednisone  Daily  ranexa   Q 12 hrs  Prograf   Bid  IVF  50/hr  No PRN meds  ·     PROGRESS  NOTE   1/27  MARKUS - in the setting of renal transplant  Likely prerenal due to diarrhea, poor oral intake  Improved with IVF's  Diuretics held  2  Diarrhea - resolved  C  Diff - negative  Likely viral gastroenteritis  3  Abdominal pain - resolved  Likely viral gastroenteritis  Mesenteric doppler - >70% stenosis in superior mesenteric artery and celiac artery  Vascular consult  4  Polycystic kidney disease - s/p renal transplant  Ct home meds  Jacob Delfina in am - [phone] - after vascular eval  5  Type 2 diabetes - diet controlled  6   Tobacco abuse - refused nicotine patch  Smoking cessation advise  7  CAD - s/p CABG - - ct ASA, Plavix, statins, Imdur  8  Seizure disorder - continue Depakote    Meds  1/27  Above  No PRN Meds    Vascular  Consult  1/28  Diarrhea, abdominal pain consistent with viral gastroenteritis  MARKUS, resolved  Asymptomatic SMA, celiac stenosis  Asymptomatic 3 1 cm infrarenal AAA  PAD, s/p L SFA PTA/stent 1/3/17  Hx L common carotid artery stenosis, s/p L CCA stent 3/24/2016  Polycystic kidney disease, s/p renal transplantation  HTN  HLD  Type II DM  CAD, s/p CABG  COPD  Hx nicotine dependence (48 p/y)  Plan:  --d/w Dr Wendy Abarca  --incidental finding of asymptomatic SMA and celiac stenosis  Symptoms not consistent with mesenteric ischemia  --no vascular intervention indicated  --continue aspirin, Plavix, statin  --smoking cessation  --outpatient follow-up with Dr Kat Brown for SMA/celiac stenosis and known AAA, carotid disease and PAD  Patient due for carotid duplex and TALIB  Will obtain all studies and return to office to discuss all vascular disease and surveillance  Discussed at length with patient and  at the bedside    --d/w SLIM  Thank you for allowing us to participate in the care of this patient        Discharge Plan:    Pt  D/c  Home 1/28    Thank you,  [address] in the [facility] by [address] for 2017  Network Utilization Review Department  Phone: [phone]; Fax [phone]  ATTENTION: The Network Utilization Review Department is now centralized for our 7 Facilities  Make a note that we have a new phone and fax numbers for our Department  Please call with any questions or concerns to [phone] and carefully follow the prompts so that you are directed to the right person  All voicemails are confidential  Fax any determinations, approvals, denials, and requests for initial or continue stay review clinical to [phone]  Due to HIGH CALL volume, it would be easier if you could please send faxed requests to expedite your requests and in part, help us provide discharge notifications faster

## 2018-01-31 PROBLEM — N17.9 ACUTE RENAL FAILURE (HCC): Status: ACTIVE | Noted: 2018-01-01

## 2018-01-31 PROBLEM — R10.9 ABDOMINAL PAIN: Status: RESOLVED | Noted: 2018-01-01 | Resolved: 2018-01-01

## 2018-01-31 PROBLEM — M70.61 GREATER TROCHANTERIC BURSITIS OF RIGHT HIP: Status: ACTIVE | Noted: 2017-01-01

## 2018-01-31 PROBLEM — N18.30 CHRONIC KIDNEY DISEASE, STAGE 3 (HCC): Status: ACTIVE | Noted: 2017-01-01

## 2018-01-31 PROBLEM — K55.1 MESENTERIC ARTERY STENOSIS (HCC): Status: ACTIVE | Noted: 2018-01-01

## 2018-01-31 NOTE — PROGRESS NOTES
Assessment/Plan:    No problem-specific Assessment & Plan notes found for this encounter  There are no diagnoses linked to this encounter  Subjective:  Chief Complaint   Patient presents with    Transition of Care Management           Patient ID: Duane Ser is a 69 y o  female      HPI      Review of Systems      Objective:     Physical Exam

## 2018-01-31 NOTE — PROGRESS NOTES
Assessment/Plan:      Diagnoses and all orders for this visit:    Acute renal failure, unspecified acute renal failure type [facility])  Comments:  Patient will have her basic metabolic profile done and continue current care  Orders:  -     Basic metabolic panel; Future  -     Basic metabolic panel    Mesenteric artery stenosis (HCC)  Comments:  Patient to see vascular as discussed and have the ordered tests done    Type 2 diabetes mellitus with stage 3 chronic kidney disease, without long-term current use of insulin (HCC)  Comments:  stable on current meds    Coronary artery disease involving native coronary artery of native heart without angina pectoris  Comments:  stabel followup with Dr Chau Pennington as scheduled    Essential hypertension  Comments:  stable continue meds    Tobacco abuse  Comments:  start chantix as directed  Orders:  -     varenicline (CHANTIX KWASI) 0 5 MG X 11 & 1 MG X 42 tablet; Take one 0 5mg tablet by mouth once daily for 3 days, then increase to one 0 5mg tablet twice daily for 3 days, then increase to one 1mg tablet twice daily  Other orders  -     alendronate (FOSAMAX) 70 mg tablet; Take 70 mg by mouth         Subjective:     Patient ID: Yoshi Estrada is a 69 y o  female  Patient is here for followup from her hospitalization patient had exterme abdominal pain, nausea and vomiting She then went to ER at [facility] on 1/25/2018 She was found to have an acute renal injury She had CT scan done that showed ? Mesenteric artery and celiac artery stenosis patient underwent IV hydration, holding of the bumex   She had vascular consult and also mesenteric duplex She was cound to have 70% stenosis of the meseneteric artery Patietnt Creatine on admission was 1 64 and corrected to 1 04 following hydration and bumex being held She had resolution of her abdominal pain and was discharged on 1/28/2018 She was supposed to have a BMP done in 3 days She was also then to have carotid scan and followup with vasculat which she has scheduled She is still amoking She quit once before with chantix and is willing to try again         Review of Systems   Constitutional: Negative for fatigue, fever and unexpected weight change  HENT: Negative for congestion, sinus pain and trouble swallowing  Eyes: Negative for discharge and visual disturbance  Respiratory: Negative for cough, chest tightness, shortness of breath and wheezing  Cardiovascular: Negative for chest pain, palpitations and leg swelling  Gastrointestinal: Negative for abdominal pain, blood in stool, constipation, diarrhea, nausea and vomiting  Genitourinary: Negative for difficulty urinating, dysuria, frequency and hematuria  Musculoskeletal: Negative for arthralgias, gait problem and joint swelling  Skin: Negative for rash and wound  Allergic/Immunologic: Negative for environmental allergies and food allergies  Neurological: Negative for dizziness, syncope, weakness, numbness and headaches  Hematological: Negative for adenopathy  Does not bruise/bleed easily  Psychiatric/Behavioral: Negative for confusion, decreased concentration and sleep disturbance  The patient is not nervous/anxious  Objective:     Physical Exam   Constitutional: She is oriented to person, place, and time  She appears well-developed and well-nourished  HENT:   Head: Normocephalic and atraumatic  Right Ear: Hearing, tympanic membrane and external ear normal    Left Ear: Hearing, tympanic membrane and external ear normal    Eyes: Conjunctivae and EOM are normal  Pupils are equal, round, and reactive to light  Neck: Neck supple  No thyromegaly present  Cardiovascular: Normal rate and normal heart sounds  Pulmonary/Chest: Effort normal and breath sounds normal  She has no wheezes  She has no rales  Abdominal: Soft  Bowel sounds are normal  She exhibits no distension  There is no tenderness  Musculoskeletal: She exhibits no edema or tenderness     Lymphadenopathy: She has no cervical adenopathy  Neurological: She is alert and oriented to person, place, and time  No cranial nerve deficit  Coordination normal    Skin: Skin is warm and dry  No rash noted  Psychiatric: She has a normal mood and affect  Her behavior is normal  Judgment and thought content normal          Vitals:    01/31/18 1253   BP: 120/78   Weight: 79 8 kg (176 lb)   Height: 5' 3" (1 6 m)       Transitional Care Management Review:  Catherine Wise is a 69 y o  female here for TCM follow up  During the TCM phone call patient stated:    Date and time hospital follow up call was made:  1/30/2018  1:39 PM  Hospital care reviewed:  Records reviewed  Patient was hopsitalized at:  [facility]  Date of admission:  1/25/18  Date of discharge:  1/28/18  Were the patients medicaitons reviewed and updated:  No  Current symptoms:  None  Should patient be enrolled in anticoag monitoring?:  No  Scheduled for follow up?:  Yes  Did you obtain your prescribed medications:  Yes  Do you need help managing your perscriptions or medications:  No  Is transportation to your appointments needed:  No  I have advised the patient to call PCP with any new or worsening symptoms (please type in name along with any credentials):   Faby Jin, DO

## 2018-02-09 NOTE — TELEPHONE ENCOUNTER
Patient said she is having abdominal pain after eating and has lost about 10 pounds over last 3-4 weeks from not eating  Denied any vomiting post prandial    She called her PCP for a GI referral, however, they referred her to vascular because of previous issues  Will ask my scheduling department to bring her in next week to see Dr Ifrah Coyle

## 2018-02-09 NOTE — TELEPHONE ENCOUNTER
Her stomach pain was determined to be due to her vascular issue She needs to call vascular about it not GI

## 2018-02-14 PROBLEM — T86.10 KIDNEY TRANSPLANT COMPLICATION: Status: ACTIVE | Noted: 2018-01-01

## 2018-02-14 PROBLEM — R10.13 EPIGASTRIC ABDOMINAL PAIN: Status: ACTIVE | Noted: 2018-01-01

## 2018-02-14 PROBLEM — N18.9 CHRONIC KIDNEY DISEASE: Status: ACTIVE | Noted: 2018-01-01

## 2018-02-14 PROBLEM — K27.9 PEPTIC ULCER DISEASE: Status: ACTIVE | Noted: 2018-01-01

## 2018-02-14 PROBLEM — K55.1 CHRONIC MESENTERIC ISCHEMIA (HCC): Status: ACTIVE | Noted: 2018-01-01

## 2018-02-14 NOTE — PATIENT INSTRUCTIONS
Recommend a Gastroenterology consultation to evaluate for possible peptic ulcer disease and need for upper endoscopy  If unable to adequately hydrate/pain worsens recommend you proceed to the emergency department for further evaluation and treatment  The smaller more frequent meals  Avoid fatty/fried foods  If gastroenterology workup is unrevealing recommend proceeding with mesenteric angiogram with possible intervention    Will require nephrology clearance due to history of renal transplant/chronic kidney disease

## 2018-02-14 NOTE — PROGRESS NOTES
Mesenteric artery stenosis (HCC)  Mesenteric duplex suggestive of 70% SMA stenosis by velocity criteria    Peptic ulcer disease  Epigastric abdominal pain after eating associated with diarrhea  Recommend GI consultation    Discussed potential workup moving forward with regards to possible EGD to rule out peptic ulcer disease versus proceeding with mesenteric angiogram with possible SMA intervention  Would favor completion of GI workup prior to mesenteric angiogram in light of patient's history of chronic kidney disease/renal transplant  If in fact GI workup is unrevealing will require nephrology clearance prior to proceeding with mesenteric angiogram   I discussed with both patient and her  that if she is unable to adequately hydrate, pain worsens and/or experiences worsening generalized fatigue she should return to the emergency department for evaluation and possible admission for additional care as indicated  Assessment/Plan   Diagnoses and all orders for this visit:    Mesenteric artery stenosis [facility])  -     Ambulatory referral to Gastroenterology; Future    Chronic mesenteric ischemia (HCC)    PAD (peripheral artery disease) (HCC)    Chronic kidney disease, stage 3    Renal transplant, status post    Tobacco abuse    Peptic ulcer disease  -     Ambulatory referral to Gastroenterology; Future        Chief Complaint   Patient presents with    Aneurysm      3 1 AAA  1/28/18 Pt went into the ED for abdominal pain  Pt does not have a appeitite  Pt c/o lower back pain  Subjective   Patient ID: Lalita Castaneda is a 69 y o  female  Audie Stable presents to the office with complaints of postprandial abdominal pain associated with diarrhea over the past several weeks  She was hospitalized at the end of January for 4 days for the same  She was reportedly okay during the hospitalization with regards to eating without any abdominal pain   She was ultimately discharged and for a few days while at home she was doing okay however over recent days she has begun to experience recurrent postprandial abdominal pain  She has lost approximately 20 pounds over the past 4 weeks  Prior to her recent hospitalization she denies any postprandial abdominal pain  She denies any bright red per rectum/melena  She denies any hematemesis  The following portions of the patient's history were reviewed and updated as appropriate: allergies, current medications, past family history, past medical history, past social history, past surgical history and problem list     Review of Systems   Constitutional: Positive for appetite change, chills, diaphoresis, fatigue and unexpected weight change  HENT: Positive for rhinorrhea  Eyes: Negative  Gastrointestinal: Positive for abdominal distention, abdominal pain and diarrhea  Endocrine: Negative  Genitourinary: Positive for dysuria  Musculoskeletal: Positive for back pain and gait problem  Skin: Negative  Allergic/Immunologic: Negative  Neurological: Positive for weakness and headaches  Hematological: Negative  Psychiatric/Behavioral: Negative  I have personally reviewed the ROS entered by MA and agree as documented      Patient Active Problem List   Diagnosis    Arteriosclerosis of left carotid artery    Renal transplant, status post    CAD (coronary artery disease)    [facility] [address] [facility]    Seizure disorder [facility] [address] [facility]    Hypertension    Tobacco abuse    Abdominal aortic aneurysm without rupture [facility] [address] [facility]    Type 2 diabetes mellitus with renal complication (HCC)    Atherosclerotic PVD with intermittent claudication (HCC)    Benign essential hypertension    Carotid artery disease (HCC)    Chronic kidney disease, stage 3    Depression    Hyperlipidemia    GERD without esophagitis    Greater trochanteric bursitis of right hip    Piriformis syndrome, right    Sleep apnea    Acute renal failure ([facility] [address] [facility]    Mesenteric artery stenosis ([facility] [address] [facility]  Chronic mesenteric ischemia (HCC)    Peptic ulcer disease    Chronic kidney disease    Kidney transplant complication       Past Surgical History:   Procedure Laterality Date    ARTERIOGRAM Left 3/24/2016    Procedure: AORTIC ARCH AORTOGRAM/CEREBRAL ANGIOGRAM ;  Surgeon: Maxwell Pineda DO;  Location: [facility];  Service:     BREAST BIOPSY      OPEN; ONSET: 2006    CARDIAC CATHETERIZATION      OUTCOME: SUCCESSFUL; ONSET: AUG2008    CAROTID ARTERY ANGIOPLASTY Left     managed by:Bernardo Jaimes;  onset: 24Mar2016    CAROTID STENT Left 3/24/2016    Procedure: COMMON CAROTID ARTERY STENT PLACEMENT ;  Surgeon: Maxwell Pineda DO;  Location: [facility];  Service:     CATARACT EXTRACTION W/  INTRAOCULAR LENS IMPLANT Bilateral     CORONARY ANGIOPLASTY      onset: 2006    CORONARY ANGIOPLASTY WITH STENT PLACEMENT      1 INITIAL    CORONARY ARTERY BYPASS GRAFT      X 3; ONSET: 1998    CORONARY STENT PLACEMENT      NEPHRECTOMY TRANSPLANTED ORGAN      OTHER SURGICAL HISTORY      EMG Dynamic surface, 1-12 muscles; onset: Feb2008    TRANSLUMINAL ANGIOPLASTY Left     intraoperative; LEFT SFA ANGIOPLAST; LEFT SFA SELF EXPANDING STENT; Onset: [id number]    TRANSPLANTATION RENAL      LAST ASSESSED: [id number]       Family History   Problem Relation Age of Onset    Coronary artery disease Mother     Gout Mother     Hypertension Mother     Cancer Father     Coronary artery disease Family     Diabetes Family     Polycystic kidney disease Family        Social History     Social History    Marital status: /Civil Union     Spouse name: N/A    Number of children: N/A    Years of education: N/A     Occupational History    Retired      Social History Main Topics    Smoking status: Current Every Day Smoker     Packs/day: 1 00     Years: 50 00    Smokeless tobacco: Never Used    Alcohol use No    Drug use: No    Sexual activity: Not on file     Other Topics Concern    Not on file     Social History Narrative    Uses safety equipment- seat belts       No Known Allergies      Current Outpatient Prescriptions:     acetaminophen (TYLENOL) 325 mg tablet, Take 2 tablets (650 mg total) by mouth every 6 (six) hours as needed for mild pain , Disp: 30 tablet, Rfl: 0    alendronate (FOSAMAX) 70 mg tablet, Take 70 mg by mouth, Disp: , Rfl:     aspirin 81 MG tablet, Take 81 mg by mouth every evening   , Disp: , Rfl:     bumetanide (BUMEX) 1 mg tablet, Take 1 mg by mouth every evening , Disp: , Rfl:     Calcium Carbonate (CALTRATE 600) 1500 (600 Ca) MG TABS, Take 1,200 mg by mouth every evening, Disp: , Rfl: 0    clonazePAM (KlonoPIN) 0 5 mg tablet, Take 0 5 mg by mouth every evening , Disp: , Rfl:     clopidogrel (PLAVIX) 75 mg tablet, Take 75 mg by mouth daily  , Disp: , Rfl:     divalproex sodium (DEPAKOTE) 500 mg EC tablet, Take 500 mg by mouth 2 (two) times a day , Disp: , Rfl:     DULoxetine (CYMBALTA) 30 mg delayed release capsule, Take 30 mg by mouth daily  , Disp: , Rfl:     FLUoxetine (PROzac) 20 MG tablet, Take 20 mg by mouth daily  , Disp: , Rfl:     isosorbide mononitrate (IMDUR) 30 mg 24 hr tablet, Take 30 mg by mouth daily  , Disp: , Rfl:     L-Methylfolate-B6-B12 (METANX PO), Take by mouth daily  , Disp: , Rfl:     magnesium chloride (MAG64) 535 mg, Take 2 tablets (128 mg total) by mouth 3 (three) times a week Monday, Wednesday, Friday twice daily, Disp: , Rfl: 0    metoprolol tartrate (LOPRESSOR) 25 mg tablet, Take 25 mg by mouth 2 (two) times a day , Disp: , Rfl:     Multiple Vitamin (MULTIVITAMIN) tablet, Take 1 tablet by mouth daily  , Disp: , Rfl:     mycophenolate (MYFORTIC) 360 MG TBEC, Take 360 mg by mouth 2 (two) times a day , Disp: , Rfl:     NON FORMULARY, , Disp: , Rfl:     Omega-3 Fatty Acids (FISH OIL) 1200 MG CAPS, Take 1 capsule (1,200 mg total) by mouth 2 (two) times a day, Disp: , Rfl: 0    omeprazole (PriLOSEC) 20 mg delayed release capsule, Take 20 mg by mouth daily  , Disp: , Rfl:     predniSONE 5 mg tablet, Take 5 mg by mouth daily  , Disp: , Rfl:     ranolazine (RANEXA) 500 mg 12 hr tablet, Take 500 mg by mouth 2 (two) times a day , Disp: , Rfl:     simvastatin (ZOCOR) 40 mg tablet, Take 40 mg by mouth every morning , Disp: , Rfl:     tacrolimus (PROGRAF) 1 mg capsule, Take 2 capsules (2 mg total) by mouth 2 (two) times a day Home dose, Disp: , Rfl: 0    varenicline (CHANTIX KWASI) 0 5 MG X 11 & 1 MG X 42 tablet, Take one 0 5mg tablet by mouth once daily for 3 days, then increase to one 0 5mg tablet twice daily for 3 days, then increase to one 1mg tablet twice daily  , Disp: 53 tablet, Rfl: 0    I have reviewed the imaging and the findings are:  Imaging:   CT without contrast reviewed:   Mesenteric duplex reviewed: CONCLUSION:  Impression  There is an infra-renal abdominal aortic aneurysm, measuring approximately 3 1  cm  (AP) by 3 1 cm  (TRV)  Findings suggest a >70% stenosis in the visualized portions of the superior  mesenteric artery and visualized portion of the celiac artery  However, this study is very technically limited, difficult due to abundant  abdominal bowel gas and patient tenderness with probe pressure, and patient body  habitus  The inferior mesenteric artery could not be identified    Objective     Physical Exam:    General appearance: fatigued  Skin: Skin color, texture, turgor normal  No rashes or lesions  Neurologic: Grossly normal  Lungs: clear to auscultation bilaterally  Chest wall: no tenderness  Heart: regular rate and rhythm and S1, S2 normal  Abdomen: soft, non-tender; bowel sounds normal; no masses,  no organomegaly  Extremities: extremities normal, warm and well-perfused; no cyanosis, clubbing, or edema    Pulse exam:  Radial: Right: 1+ Left[de-identified] non-palpable

## 2018-02-14 NOTE — LETTER
February 15, 2018     Rachna Guillen DO  [address]  [address]    Patient: Graylon Stage   YOB: 1950   Date of Visit: 2/14/2018       Dear Dr Jessica Garcia:    Thank you for referring Kane Gongora to me for evaluation  Below are my notes for this consultation  If you have questions, please do not hesitate to call me  I look forward to following your patient along with you  Sincerely,        Jason Gonzalez DO        CC: DO Jason Silva DO  2/15/2018 11:52 AM  Sign at close encounter  [facility] - Outpatient Consultation  Graylon Stage 69 y o  female MRN: [id number]  Encounter: [id number]          ASSESSMENT AND PLAN:      69year old female referred by Vascular surgeon due to chronic abdominal pain  She has a history of CABG as well as prior kidney transplant  Also had a CEA  She has imaging suggestive of SMA stenosis and given her history would like us to proceed with EGD first to assess for a GI cause of her abdominal pain which is not unreasonable given her pain after eating as PUD is on the differential     I have arranged this urgently as the pt told me in the office that she stopped her plavix on her own accord 5 days ago and I am concerned about her not being on this medication given her vascular disease  Proceed with EGD  Further recs after EGD  Also discussed with pt and  the importance of quitting smoking       ______________________________________________________________________    HPI:  69year old female referred by Vascular surgeon  She has a history of CABG at age 48 as well as prior kidney transplant 12 years ago due to polycystic kidney disease and is on immunosuppresion and on plavix daily  The pt and her  who accompanies her state she has had abdominal pain and diarrhea since beginning of January  She takes tylenol for sciatica but denies any NSAIDs    Reports pain is worse after eating  Never had this before  Denies any nausea, vomiting, or GERD  She reports her last colonoscopy was about 5 years ago  Denies polyps but thinks she had diverticulosis  She has never had an EGD  Denies any blood in her stools  She reports having a BM 3 times daily today with a small amount of formed stool yesterday and today  Does have chills but no fevers  She is a smoker and non drinker  She tells me she stopped her plavix on her own 5 days ago as she couldn't tolerate the pain  REVIEW OF SYSTEMS:    CONSTITUTIONAL: Denies any fever, chills, rigors, and weight loss  HEENT: No earache or tinnitus  Denies hearing loss or visual disturbances  CARDIOVASCULAR: No chest pain or palpitations  RESPIRATORY: Denies any cough, hemoptysis, shortness of breath or dyspnea on exertion  GASTROINTESTINAL: As noted in the History of Present Illness  GENITOURINARY: No problems with urination  Denies any hematuria or dysuria  NEUROLOGIC: No dizziness or vertigo, denies headaches  MUSCULOSKELETAL: Denies any muscle or joint pain  SKIN: Denies skin rashes or itching  ENDOCRINE: Denies excessive thirst  Denies intolerance to heat or cold  PSYCHOSOCIAL: Denies depression or anxiety  Denies any recent memory loss         Historical Information   Past Medical History:   Diagnosis Date    Anxiety     Arteriosclerosis of left carotid artery     RESOLVED: [id number]    CAD (coronary artery disease)     Cardiac disorder     Carotid artery stenosis     Cataract     RESOLVED: [id number]    Chronic kidney disease     Current chronic use of systemic steroids     RESOLVED: [id number]    Depression     Diabetes mellitus (HCC)     Headache     Heart failure (HCC)     Hyperlipidemia     Hypertension     Insomnia     Kidney transplant complication     Migraine     RESOLVED: [id number]    Nicotine dependence     Perforation of left tympanic membrane      Past Surgical History:   Procedure Laterality Date    ARTERIOGRAM Left 3/24/2016    Procedure: AORTIC ARCH AORTOGRAM/CEREBRAL ANGIOGRAM ;  Surgeon: Zeke Dia DO;  [facility]: [facility];  Service:     BREAST BIOPSY      OPEN; ONSET: 2006    CARDIAC CATHETERIZATION      OUTCOME: SUCCESSFUL; ONSET: AUG2008    CAROTID ARTERY ANGIOPLASTY Left     managed by:Bernardo Jaimes;  onset: [id number]    CAROTID STENT Left 3/24/2016    Procedure: COMMON CAROTID ARTERY STENT PLACEMENT ;  Surgeon: Zeke Dia DO;  Location[facility];  Service:     CATARACT EXTRACTION W/  INTRAOCULAR LENS IMPLANT Bilateral     CORONARY ANGIOPLASTY      onset: 2006    CORONARY ANGIOPLASTY WITH STENT PLACEMENT      1 INITIAL    CORONARY ARTERY BYPASS GRAFT      X 3; ONSET: 1998    CORONARY STENT PLACEMENT      NEPHRECTOMY TRANSPLANTED ORGAN      OTHER SURGICAL HISTORY      EMG Dynamic surface, 1-12 muscles; onset: [id number]    TRANSLUMINAL ANGIOPLASTY Left     intraoperative; LEFT SFA ANGIOPLAST; LEFT SFA SELF EXPANDING STENT; Onset: [id number]    TRANSPLANTATION RENAL      LAST ASSESSED: [id number]     Social History   History   Alcohol Use No     History   Drug Use No     History   Smoking Status    Current Every Day Smoker    Packs/day: 1 00    Years: 50 00   Smokeless Tobacco    Never Used     Family History   Problem Relation Age of Onset    Coronary artery disease Mother     Gout Mother     Hypertension Mother     Cancer Father     Coronary artery disease Family     Diabetes Family     Polycystic kidney disease Family        Meds/Allergies       Current Outpatient Prescriptions:     acetaminophen (TYLENOL) 325 mg tablet    alendronate (FOSAMAX) 70 mg tablet    aspirin 81 MG tablet    bumetanide (BUMEX) 1 mg tablet    Calcium Carbonate (CALTRATE 600) 1500 (600 Ca) MG TABS    clonazePAM (KlonoPIN) 0 5 mg tablet    clopidogrel (PLAVIX) 75 mg tablet    divalproex sodium (DEPAKOTE) 500 mg EC tablet    DULoxetine (CYMBALTA) 30 mg delayed release capsule    FLUoxetine (PROzac) 20 MG tablet    isosorbide mononitrate (IMDUR) 30 mg 24 hr tablet    L-Methylfolate-B6-B12 (METANX PO)    magnesium chloride (MAG64) 535 mg    metoprolol tartrate (LOPRESSOR) 25 mg tablet    Multiple Vitamin (MULTIVITAMIN) tablet    mycophenolate (MYFORTIC) 360 MG TBEC    NON FORMULARY    Omega-3 Fatty Acids (FISH OIL) 1200 MG CAPS    omeprazole (PriLOSEC) 20 mg delayed release capsule    predniSONE 5 mg tablet    ranolazine (RANEXA) 500 mg 12 hr tablet    simvastatin (ZOCOR) 40 mg tablet    tacrolimus (PROGRAF) 1 mg capsule    varenicline (CHANTIX KWASI) 0 5 MG X 11 & 1 MG X 42 tablet    No Known Allergies      Objective     Blood pressure 140/70, pulse 102, temperature 98 5 °F (36 9 °C), temperature source Oral, height 5' 3" (1 6 m), weight 73 5 kg (162 lb)  Body mass index is 28 7 kg/m²  PHYSICAL EXAM:      General Appearance:   Alert, cooperative, no distress   HEENT:   Normocephalic, atraumatic, anicteric      Neck:  Supple, symmetrical, trachea midline   Lungs:   Clear to auscultation bilaterally; no rales, rhonchi or wheezing; respirations unlabored    Heart[de-identified]   Regular rate and rhythm; no murmur, rub, or gallop  Abdomen:   Soft, non-tender, non-distended; normal bowel sounds; no masses, no organomegaly    Genitalia:   Deferred    Rectal:   Deferred    Extremities:  No cyanosis, clubbing or edema    Pulses:  2+ and symmetric    Skin:  No jaundice, rashes, or lesions    Lymph nodes:  No palpable cervical lymphadenopathy        Lab Results:   No visits with results within 1 Day(s) from this visit     Latest known visit with results is:   Admission on 01/25/2018, Discharged on 01/28/2018   Component Date Value    WBC 01/25/2018 11 39*    RBC 01/25/2018 5 33*    Hemoglobin 01/25/2018 18 3*    Hematocrit 01/25/2018 54 4*    MCV 01/25/2018 102*    MCH 01/25/2018 34 3     MCHC 01/25/2018 33 6     RDW 01/25/2018 16 0*    MPV 01/25/2018 12 0     Platelets 70/68/0637 104*    nRBC 01/25/2018 0     Neutrophils Relative 01/25/2018 61     Lymphocytes Relative 01/25/2018 28     Monocytes Relative 01/25/2018 10     Eosinophils Relative 01/25/2018 1     Basophils Relative 01/25/2018 0     Neutrophils Absolute 01/25/2018 6 94     Lymphocytes Absolute 01/25/2018 3 22     Monocytes Absolute 01/25/2018 1 16     Eosinophils Absolute 01/25/2018 0 06     Basophils Absolute 01/25/2018 0 01     Sodium 01/25/2018 141     Potassium 01/25/2018 3 2*    Chloride 01/25/2018 102     CO2 01/25/2018 28     Anion Gap 01/25/2018 11     BUN 01/25/2018 17     Creatinine 01/25/2018 1 63*    Glucose 01/25/2018 167*    Calcium 01/25/2018 9 7     AST 01/25/2018 16     ALT 01/25/2018 14     Alkaline Phosphatase 01/25/2018 51     Total Protein 01/25/2018 6 5     Albumin 01/25/2018 2 9*    Total Bilirubin 01/25/2018 0 56     eGFR 01/25/2018 32     Lipase 01/25/2018 63*    Salmonella sp PCR 01/25/2018 None Detected     Shigella sp/Enteroinvasi* 01/25/2018 None Detected     Campylobacter sp (jejuni* 01/25/2018 None Detected     Shiga toxin 1/Shiga darci* 01/25/2018 None Detected     C difficile toxin by PCR 01/25/2018 NEGATIVE for C difficle toxin by PCR        Valproic Acid, Total 01/25/2018 13*    TACROLIMUS 01/25/2018 4 8     Sodium 01/26/2018 140     Potassium 01/26/2018 3 8     Chloride 01/26/2018 106     CO2 01/26/2018 28     Anion Gap 01/26/2018 6     BUN 01/26/2018 13     Creatinine 01/26/2018 1 04     Glucose 01/26/2018 101     Calcium 01/26/2018 9 2     eGFR 01/26/2018 55     WBC 01/26/2018 7 84     RBC 01/26/2018 4 92     Hemoglobin 01/26/2018 15 9*    Hematocrit 01/26/2018 50 4*    MCV 01/26/2018 102*    MCH 01/26/2018 32 3     MCHC 01/26/2018 31 5     RDW 01/26/2018 16 1*    Platelets 20/40/8326 119*    MPV 01/26/2018 12 9*    Magnesium 01/26/2018 2 0     Sodium 01/27/2018 143     Potassium 01/27/2018 3 6     Chloride 01/27/2018 109*    CO2 01/27/2018 26     Anion Gap 01/27/2018 8     BUN 01/27/2018 10     Creatinine 01/27/2018 1 23     Glucose 01/27/2018 125     Calcium 01/27/2018 8 7     eGFR 01/27/2018 45     Sodium 01/28/2018 140     Potassium 01/28/2018 3 7     Chloride 01/28/2018 107     CO2 01/28/2018 26     Anion Gap 01/28/2018 7     BUN 01/28/2018 9     Creatinine 01/28/2018 1 04     Glucose 01/28/2018 103     Calcium 01/28/2018 8 6     eGFR 01/28/2018 55          Radiology Results:   Ct Abdomen Pelvis Wo Contrast    Result Date: 1/25/2018  Narrative: CT ABDOMEN AND PELVIS WITHOUT IV CONTRAST INDICATION:  58-year-old woman with abdominal pain  COMPARISON: Abdominal CT 5/9/2016  TECHNIQUE:  CT examination of the abdomen and pelvis was performed without intravenous contrast   Reformatted images were created in axial, sagittal, and coronal planes  Radiation dose length product (DLP) for this visit:  657 mGy-cm   This examination, like all CT scans performed in the [facility], was performed utilizing techniques to minimize radiation dose exposure, including the use of iterative reconstruction and automated exposure control  Enteric contrast was administered  FINDINGS: ABDOMEN LOWER CHEST:  No significant abnormalities identified in the lower chest  LIVER/BILIARY TREE:  One or more subcentimeter sharply circumscribed low-density hepatic lesion(s) are noted, too small to accurately characterize, but statistically most likely to represent subcentimeter hepatic cysts  Hepatic contours are normal   No  biliary dilatation  GALLBLADDER:  There are gallstone(s) within the gallbladder, without pericholecystic inflammatory changes  SPLEEN:  Unremarkable  PANCREAS:  Unremarkable  ADRENAL GLANDS:  Unremarkable  KIDNEYS/URETERS:  Status post left nephrectomy  Right polycystic kidney disease  Right lower quadrant renal allograft is unremarkable on noncontrast CT  No hydronephrosis  STOMACH AND BOWEL:  Unremarkable  APPENDIX:  A normal appendix was visualized  ABDOMINOPELVIC CAVITY:  No ascites or free intraperitoneal air  No lymphadenopathy  VESSELS:  Aortic atherosclerosis  3 1 cm infrarenal abdominal aortic aneurysm (series 602 image 74) is minimally changed since the prior study  PELVIS REPRODUCTIVE ORGANS:  Unremarkable for patient's age  URINARY BLADDER:  Unremarkable  ABDOMINAL WALL/INGUINAL REGIONS:  Unremarkable  OSSEOUS STRUCTURES:  No acute fracture or destructive osseous lesion  Impression: 1  Status post left nephrectomy  Right lower quadrant renal allograft is unremarkable on noncontrast CT  2   No acute inflammatory process in the abdomen or pelvis  3   3 1 cm infrarenal abdominal aortic aneurysm is minimally changed since the prior study  4   Cholelithiasis  Workstation performed: [id number]     Vas Celiac And/or Mesenteric Duplex; Complete Study    Result Date: 1/26/2018  Narrative:  [facility] REPORT [facility]INICAL: Indications:  Patient admitted with nausea, vomiting, diarrhea and abdominal pain, persisting for 2 weeks  Known abdominal aortic aneurysm  History of renal transplant  Risk Factors The patient has history of obesity, hypertension, diabetes  and hyperlipidemia  FINDINGS:  Unilateral     Impression  PSV  EDV  AP Diam  TRV Diam  Sup-Magdalena Ao                  50    7                     Celiac                     251   43                     Prox  SMA                  460   90                     Px Inf-Roberto Ao  Aneurysm     64    0      3 1       3 1  Mid  SMA                    83   24                        CONCLUSION: Impression There is an infra-renal abdominal aortic aneurysm, measuring approximately 3 1 cm  (AP) by 3 1 cm  (TRV)  Findings suggest a >70% stenosis in the visualized portions of the superior mesenteric artery and visualized portion of the celiac artery   However, this study is very technically limited, difficult due to abundant abdominal bowel gas and patient tenderness with probe pressure, and patient body habitus  The inferior mesenteric artery could not be identified    SIGNATURE: Electronically Signed by: Tyrese Carmen MD, [address] on 2018-01-26 10:16:17 PM

## 2018-02-14 NOTE — PROGRESS NOTES
[facility] - Outpatient Consultation  Qi Obrien 69 y o  female MRN: [id number]  Encounter: [id number]          ASSESSMENT AND PLAN:      69year old female referred by Vascular surgeon due to chronic abdominal pain  She has a history of CABG as well as prior kidney transplant  Also had a CEA  She has imaging suggestive of SMA stenosis and given her history would like us to proceed with EGD first to assess for a GI cause of her abdominal pain which is not unreasonable given her pain after eating as PUD is on the differential     I have arranged this urgently as the pt told me in the office that she stopped her plavix on her own accord 5 days ago and I am concerned about her not being on this medication given her vascular disease  Proceed with EGD  Further recs after EGD  Also discussed with pt and  the importance of quitting smoking       ______________________________________________________________________    HPI:  69year old female referred by Vascular surgeon  She has a history of CABG at age 48 as well as prior kidney transplant 12 years ago due to polycystic kidney disease and is on immunosuppresion and on plavix daily  The pt and her  who accompanies her state she has had abdominal pain and diarrhea since beginning of January  She takes tylenol for sciatica but denies any NSAIDs  Reports pain is worse after eating  Never had this before  Denies any nausea, vomiting, or GERD  She reports her last colonoscopy was about 5 years ago  Denies polyps but thinks she had diverticulosis  She has never had an EGD  Denies any blood in her stools  She reports having a BM 3 times daily today with a small amount of formed stool yesterday and today  Does have chills but no fevers  She is a smoker and non drinker  She tells me she stopped her plavix on her own 5 days ago as she couldn't tolerate the pain         REVIEW OF SYSTEMS:    CONSTITUTIONAL: Denies any fever, chills, rigors, and weight loss  HEENT: No earache or tinnitus  Denies hearing loss or visual disturbances  CARDIOVASCULAR: No chest pain or palpitations  RESPIRATORY: Denies any cough, hemoptysis, shortness of breath or dyspnea on exertion  GASTROINTESTINAL: As noted in the History of Present Illness  GENITOURINARY: No problems with urination  Denies any hematuria or dysuria  NEUROLOGIC: No dizziness or vertigo, denies headaches  MUSCULOSKELETAL: Denies any muscle or joint pain  SKIN: Denies skin rashes or itching  ENDOCRINE: Denies excessive thirst  Denies intolerance to heat or cold  PSYCHOSOCIAL: Denies depression or anxiety  Denies any recent memory loss         Historical Information   Past Medical History:   Diagnosis Date    Anxiety     Arteriosclerosis of left carotid artery     RESOLVED: [id number]    CAD (coronary artery disease)     Cardiac disorder     Carotid artery stenosis     Cataract     RESOLVED: [id number]    Chronic kidney disease     Current chronic use of systemic steroids     RESOLVED: [id number]    Depression     Diabetes mellitus ([address] )     Headache     Heart failure (HCC)     Hyperlipidemia     Hypertension     Insomnia     Kidney transplant complication     Migraine     RESOLVED: [id number]    Nicotine dependence     Perforation of left tympanic membrane      Past Surgical History:   Procedure Laterality Date    ARTERIOGRAM Left 3/24/2016    Procedure: AORTIC ARCH AORTOGRAM/CEREBRAL ANGIOGRAM ;  Surgeon: Dana Mackey DO;  Location: [facility];  Service:     BREAST BIOPSY      OPEN; ONSET: 2006    CARDIAC CATHETERIZATION      OUTCOME: SUCCESSFUL; ONSET: AUG2008    CAROTID ARTERY ANGIOPLASTY Left     managed by:Bernardo Jaimes;  onset: 24Mar2016    CAROTID STENT Left 3/24/2016    Procedure: COMMON CAROTID ARTERY STENT PLACEMENT ;  Surgeon: Dana Mackey DO;  Location: [facility];  Service:    Mansi Campbell CATARACT EXTRACTION W/  INTRAOCULAR LENS IMPLANT Bilateral     CORONARY ANGIOPLASTY      onset: 2006    CORONARY ANGIOPLASTY WITH STENT PLACEMENT      1 INITIAL    CORONARY ARTERY BYPASS GRAFT      X 3; ONSET: 1998    CORONARY STENT PLACEMENT      NEPHRECTOMY TRANSPLANTED ORGAN      OTHER SURGICAL HISTORY      EMG Dynamic surface, 1-12 muscles; onset: Feb2008    TRANSLUMINAL ANGIOPLASTY Left     intraoperative; LEFT SFA ANGIOPLAST; LEFT SFA SELF EXPANDING STENT; Onset: [id number]    TRANSPLANTATION RENAL      LAST ASSESSED: [id number]     Social History   History   Alcohol Use No     History   Drug Use No     History   Smoking Status    Current Every Day Smoker    Packs/day: 1 00    Years: 50 00   Smokeless Tobacco    Never Used     Family History   Problem Relation Age of Onset    Coronary artery disease Mother     Gout Mother     Hypertension Mother     Cancer Father     Coronary artery disease Family     Diabetes Family     Polycystic kidney disease Family        Meds/Allergies       Current Outpatient Prescriptions:     acetaminophen (TYLENOL) 325 mg tablet    alendronate (FOSAMAX) 70 mg tablet    aspirin 81 MG tablet    bumetanide (BUMEX) 1 mg tablet    Calcium Carbonate (CALTRATE 600) 1500 (600 Ca) MG TABS    clonazePAM (KlonoPIN) 0 5 mg tablet    clopidogrel (PLAVIX) 75 mg tablet    divalproex sodium (DEPAKOTE) 500 mg EC tablet    DULoxetine (CYMBALTA) 30 mg delayed release capsule    FLUoxetine (PROzac) 20 MG tablet    isosorbide mononitrate (IMDUR) 30 mg 24 hr tablet    L-Methylfolate-B6-B12 (METANX PO)    magnesium chloride (MAG64) 535 mg    metoprolol tartrate (LOPRESSOR) 25 mg tablet    Multiple Vitamin (MULTIVITAMIN) tablet    mycophenolate (MYFORTIC) 360 MG TBEC    NON FORMULARY    Omega-3 Fatty Acids (FISH OIL) 1200 MG CAPS    omeprazole (PriLOSEC) 20 mg delayed release capsule    predniSONE 5 mg tablet    ranolazine (RANEXA) 500 mg 12 hr tablet    simvastatin (ZOCOR) 40 mg tablet    tacrolimus (PROGRAF) 1 mg capsule    varenicline (CHANTIX KWASI) 0 5 MG X 11 & 1 MG X 42 tablet    No Known Allergies      Objective     Blood pressure 140/70, pulse 102, temperature 98 5 °F (36 9 °C), temperature source Oral, height 5' 3" (1 6 m), weight 73 5 kg (162 lb)  Body mass index is 28 7 kg/m²  PHYSICAL EXAM:      General Appearance:   Alert, cooperative, no distress   HEENT:   Normocephalic, atraumatic, anicteric      Neck:  Supple, symmetrical, trachea midline   Lungs:   Clear to auscultation bilaterally; no rales, rhonchi or wheezing; respirations unlabored    Heart[de-identified]   Regular rate and rhythm; no murmur, rub, or gallop  Abdomen:   Soft, non-tender, non-distended; normal bowel sounds; no masses, no organomegaly    Genitalia:   Deferred    Rectal:   Deferred    Extremities:  No cyanosis, clubbing or edema    Pulses:  2+ and symmetric    Skin:  No jaundice, rashes, or lesions    Lymph nodes:  No palpable cervical lymphadenopathy        Lab Results:   No visits with results within 1 Day(s) from this visit     Latest known visit with results is:   Admission on 01/25/2018, Discharged on 01/28/2018   Component Date Value    WBC 01/25/2018 11 39*    RBC 01/25/2018 5 33*    Hemoglobin 01/25/2018 18 3*    Hematocrit 01/25/2018 54 4*    MCV 01/25/2018 102*    MCH 01/25/2018 34 3     MCHC 01/25/2018 33 6     RDW 01/25/2018 16 0*    MPV 01/25/2018 12 0     Platelets 11/29/3340 104*    nRBC 01/25/2018 0     Neutrophils Relative 01/25/2018 61     Lymphocytes Relative 01/25/2018 28     Monocytes Relative 01/25/2018 10     Eosinophils Relative 01/25/2018 1     Basophils Relative 01/25/2018 0     Neutrophils Absolute 01/25/2018 6 94     Lymphocytes Absolute 01/25/2018 3 22     Monocytes Absolute 01/25/2018 1 16     Eosinophils Absolute 01/25/2018 0 06     Basophils Absolute 01/25/2018 0 01     Sodium 01/25/2018 141     Potassium 01/25/2018 3 2*    Chloride 01/25/2018 102     CO2 01/25/2018 28     Anion Gap 01/25/2018 11     BUN 01/25/2018 17     Creatinine 01/25/2018 1 63*    Glucose 01/25/2018 167*    Calcium 01/25/2018 9 7     AST 01/25/2018 16     ALT 01/25/2018 14     Alkaline Phosphatase 01/25/2018 51     Total Protein 01/25/2018 6 5     Albumin 01/25/2018 2 9*    Total Bilirubin 01/25/2018 0 56     eGFR 01/25/2018 32     Lipase 01/25/2018 63*    Salmonella sp PCR 01/25/2018 None Detected     Shigella sp/Enteroinvasi* 01/25/2018 None Detected     Campylobacter sp (jejuni* 01/25/2018 None Detected     Shiga toxin 1/Shiga darci* 01/25/2018 None Detected     C difficile toxin by PCR 01/25/2018 NEGATIVE for C difficle toxin by PCR        Valproic Acid, Total 01/25/2018 13*    TACROLIMUS 01/25/2018 4 8     Sodium 01/26/2018 140     Potassium 01/26/2018 3 8     Chloride 01/26/2018 106     CO2 01/26/2018 28     Anion Gap 01/26/2018 6     BUN 01/26/2018 13     Creatinine 01/26/2018 1 04     Glucose 01/26/2018 101     Calcium 01/26/2018 9 2     eGFR 01/26/2018 55     WBC 01/26/2018 7 84     RBC 01/26/2018 4 92     Hemoglobin 01/26/2018 15 9*    Hematocrit 01/26/2018 50 4*    MCV 01/26/2018 102*    MCH 01/26/2018 32 3     MCHC 01/26/2018 31 5     RDW 01/26/2018 16 1*    Platelets 89/04/2872 119*    MPV 01/26/2018 12 9*    Magnesium 01/26/2018 2 0     Sodium 01/27/2018 143     Potassium 01/27/2018 3 6     Chloride 01/27/2018 109*    CO2 01/27/2018 26     Anion Gap 01/27/2018 8     BUN 01/27/2018 10     Creatinine 01/27/2018 1 23     Glucose 01/27/2018 125     Calcium 01/27/2018 8 7     eGFR 01/27/2018 45     Sodium 01/28/2018 140     Potassium 01/28/2018 3 7     Chloride 01/28/2018 107     CO2 01/28/2018 26     Anion Gap 01/28/2018 7     BUN 01/28/2018 9     Creatinine 01/28/2018 1 04     Glucose 01/28/2018 103     Calcium 01/28/2018 8 6     eGFR 01/28/2018 55          Radiology Results:   Ct Abdomen Pelvis Wo Contrast    Result Date: 1/25/2018  Narrative: CT ABDOMEN AND PELVIS WITHOUT IV CONTRAST INDICATION:  56-year-old woman with abdominal pain  COMPARISON: Abdominal CT 5/9/2016  TECHNIQUE:  CT examination of the abdomen and pelvis was performed without intravenous contrast   Reformatted images were created in axial, sagittal, and coronal planes  Radiation dose length product (DLP) for this visit:  657 mGy-cm   This examination, like all CT scans performed in the [facility], was performed utilizing techniques to minimize radiation dose exposure, including the use of iterative reconstruction and automated exposure control  Enteric contrast was administered  FINDINGS: ABDOMEN LOWER CHEST:  No significant abnormalities identified in the lower chest  LIVER/BILIARY TREE:  One or more subcentimeter sharply circumscribed low-density hepatic lesion(s) are noted, too small to accurately characterize, but statistically most likely to represent subcentimeter hepatic cysts  Hepatic contours are normal   No  biliary dilatation  GALLBLADDER:  There are gallstone(s) within the gallbladder, without pericholecystic inflammatory changes  SPLEEN:  Unremarkable  PANCREAS:  Unremarkable  ADRENAL GLANDS:  Unremarkable  KIDNEYS/URETERS:  Status post left nephrectomy  Right polycystic kidney disease  Right lower quadrant renal allograft is unremarkable on noncontrast CT  No hydronephrosis  STOMACH AND BOWEL:  Unremarkable  APPENDIX:  A normal appendix was visualized  ABDOMINOPELVIC CAVITY:  No ascites or free intraperitoneal air  No lymphadenopathy  VESSELS:  Aortic atherosclerosis  3 1 cm infrarenal abdominal aortic aneurysm (series 602 image 74) is minimally changed since the prior study  PELVIS REPRODUCTIVE ORGANS:  Unremarkable for patient's age  URINARY BLADDER:  Unremarkable  ABDOMINAL WALL/INGUINAL REGIONS:  Unremarkable  OSSEOUS STRUCTURES:  No acute fracture or destructive osseous lesion  Impression: 1  Status post left nephrectomy  Right lower quadrant renal allograft is unremarkable on noncontrast CT  2   No acute inflammatory process in the abdomen or pelvis  3   3 1 cm infrarenal abdominal aortic aneurysm is minimally changed since the prior study  4   Cholelithiasis  Workstation performed: [id number]     Vas Celiac And/or Mesenteric Duplex; Complete Study    Result Date: 1/26/2018  Narrative:  [facility] REPORT [facility]INICAL: Indications:  Patient admitted with nausea, vomiting, diarrhea and abdominal pain, persisting for 2 weeks  Known abdominal aortic aneurysm  History of renal transplant  Risk Factors The patient has history of obesity, hypertension, diabetes  and hyperlipidemia  FINDINGS:  Unilateral     Impression  PSV  EDV  AP Diam  TRV Diam  Sup-Magdalena Ao                  50    7                     Celiac                     251   43                     Prox  SMA                  460   90                     Px Inf-Roberto Ao  Aneurysm     64    0      3 1       3 1  Mid  SMA                    83   24                        CONCLUSION: Impression There is an infra-renal abdominal aortic aneurysm, measuring approximately 3 1 cm  (AP) by 3 1 cm  (TRV)  Findings suggest a >70% stenosis in the visualized portions of the superior mesenteric artery and visualized portion of the celiac artery  However, this study is very technically limited, difficult due to abundant abdominal bowel gas and patient tenderness with probe pressure, and patient body habitus  The inferior mesenteric artery could not be identified    SIGNATURE: Electronically Signed by: Keven Sousa MD, [address] on 2018-01-26 10:16:17 PM

## 2018-02-15 NOTE — ANESTHESIA POSTPROCEDURE EVALUATION
Post-Op Assessment Note      CV Status:  Stable    Mental Status:  Alert and somnolent    Hydration Status:  Euvolemic    PONV Controlled:  Controlled    Airway Patency:  Patent    Post Op Vitals Reviewed: Yes          Staff: Anesthesiologist           BP      Temp     Pulse     Resp      SpO2

## 2018-02-15 NOTE — DISCHARGE INSTRUCTIONS
Peptic Ulcer   WHAT YOU NEED TO KNOW:   What is a peptic ulcer? A peptic ulcer is an open sore in the lining of your stomach, intestine, or esophagus  Peptic ulcers have different names, depending on their location  Gastric ulcers are peptic ulcers in the stomach  Duodenal ulcers are peptic ulcers in the small intestine  A peptic ulcer in the esophagus is called an esophageal ulcer  Peptic ulcers may be a short-term or long-term problem  What causes a peptic ulcer? Most peptic ulcers are caused by bacteria in the stomach called Helicobacter pylori (H  pylori)  Certain medicines such as NSAIDs may cause peptic ulcers  Zollinger-Bunch syndrome may increase your risk of a peptic ulcer because it increases acid in your stomach  Smoking and drinking too much alcohol can also increase your risk for a peptic ulcer  Your risk is also increased if you have a family member who has a peptic ulcer  What are the signs and symptoms of a peptic ulcer? The most common symptom is a burning feeling or pain in your upper abdomen  This may occur 1 to 3 hours after you eat or when your stomach is empty  The pain may be worse at night and may come and go for weeks  The pain may be relieved when you eat or take antacid medicine  You may also have nausea, vomiting, or burping  Ulcers can cause bleeding  Your bowel movements may be red or black  How is a peptic ulcer diagnosed? · Blood tests  may be done to test for H  pylori  · A sample of your bowel movement  may be sent to a lab for tests  The test can show if you have H  pylori or if there is blood in your bowel movements  · A urea breath test  checks for H  pylori  You will drink a liquid that has a radioactive carbon  Thirty minutes after you drink the liquid, you will blow into a bag  The radioactive carbon will show if H  pylori is present  · An endoscopy  uses a scope to see the inside of your digestive system   A scope is a long, flexible tube with a light on the end  A camera may be used with the scope to take pictures  During an endoscopy, your healthcare provider may find problems with how your digestive system is working  Samples may be taken from your digestive system and sent to a lab for tests  · An upper GI x-ray  is a picture of your stomach and intestines  You may be given a chalky liquid to drink before the pictures are taken  This liquid helps your stomach and intestines show up better on the x-rays  An upper GI x-ray can show if you have an ulcer  How is a peptic ulcer treated? · Medicines  that decrease the amount of acid made by your stomach may be given  You may also need medicines that protect your stomach lining from acid and antibiotics to treat H  pylori infection  · Surgery  may be needed if other treatment does not heal your ulcer  Surgery on the nerves in your stomach may be done to help your stomach make less acid  Another type of surgery removes part of your stomach  Surgery may also be done to close an ulcer that has caused a perforation (tear) through your stomach or intestines  When should I contact my healthcare provider? · You have a fever  · You have diarrhea or constipation  · Your stomach pain does not go away or gets worse after you take medicine  · You have questions or concerns about your condition or care  When should I seek immediate care or call 911? · You have a fast heartbeat, fast breathing, or are too dizzy or weak to stand up  · You have severe pain in your stomach  · Your vomit looks like coffee grounds or has blood in it  · Your bowel movements are bloody or black  · You have sudden shortness of breath  CARE AGREEMENT:   You have the right to help plan your care  Learn about your health condition and how it may be treated  Discuss treatment options with your caregivers to decide what care you want to receive  You always have the right to refuse treatment   The above information is an  only  It is not intended as medical advice for individual conditions or treatments  Talk to your doctor, nurse or pharmacist before following any medical regimen to see if it is safe and effective for you  © 2017 [address] [employer] Information is for End User's use only and may not be sold, redistributed or otherwise used for commercial purposes  All illustrations and images included in CareNotes® are the copyrighted property of [employer]  or [employer]  Diet for Stomach Ulcers and Gastritis   WHAT YOU NEED TO KNOW:   What is a diet for stomach ulcers and gastritis? A diet for ulcers and gastritis is a meal plan that limits foods that irritate your stomach  Certain foods may worsen symptoms such as stomach pain, bloating, heartburn, or indigestion  Which foods should I limit or avoid? You may need to avoid acidic, spicy, or high-fat foods  Not all foods affect everyone the same way  You will need to learn which foods worsen your symptoms and limit those foods  The following are some foods that may worsen ulcer or gastritis symptoms:  · Beverages:      ¨ Whole milk and chocolate milk    ¨ Hot cocoa and cola    ¨ Any beverage with caffeine    ¨ Regular and decaffeinated coffee    ¨ Peppermint and spearmint tea    ¨ Green and black tea, with or without caffeine    ¨ Orange and grapefruit juices    ¨ Drinks that contain alcohol    · Spices and seasonings:      ¨ Black and red pepper    ¨ Chili powder    ¨ Mustard seed and nutmeg    · Other foods:      ¨ Dairy foods made from whole milk or cream    ¨ Chocolate    ¨ Spicy or strongly flavored cheeses, such as jalapeno or black pepper    ¨ Highly seasoned, high-fat meats, such as sausage, salami, blake, ham, and cold cuts    ¨ Hot chiles and peppers    ¨ Tomato products, such as tomato paste, tomato sauce, or tomato juice  Which foods can I eat and drink? Eat a variety of healthy foods from all the food groups   Eat fruits, vegetables, whole grains, and fat-free or low-fat dairy foods  Whole grains include whole-wheat breads, cereals, pasta, and brown rice  Choose lean meats, poultry (chicken and turkey), fish, beans, eggs, and nuts  A healthy meal plan is low in unhealthy fats, salt, and added sugar  Healthy fats include olive oil and canola oil  Ask your dietitian for more information about a healthy meal plan  What other guidelines may be helpful? · Do not eat right before bedtime  Stop eating at least 2 hours before bedtime  · Eat small, frequent meals  Your stomach may tolerate small, frequent meals better than large meals  CARE AGREEMENT:   You have the right to help plan your care  Discuss treatment options with your caregivers to decide what care you want to receive  You always have the right to refuse treatment  The above information is an  only  It is not intended as medical advice for individual conditions or treatments  Talk to your doctor, nurse or pharmacist before following any medical regimen to see if it is safe and effective for you  © 2017 [facility] [facility] Information is for End User's use only and may not be sold, redistributed or otherwise used for commercial purposes  All illustrations and images included in CareNotes® are the copyrighted property of [employer]  or [employer]  Upper Endoscopy   WHAT YOU NEED TO KNOW:   An upper endoscopy is also called an upper gastrointestinal (GI) endoscopy, or an esophagogastroduodenoscopy (EGD)  You may feel bloated, gassy, or have some abdominal discomfort after your procedure  Your throat may be sore for 24 to 36 hours  You may burp or pass gas from air that is still inside your body  DISCHARGE INSTRUCTIONS:   Call 911 for any of the following:   · You have sudden chest pain or trouble breathing  Seek care immediately if:   · You feel dizzy or faint  · You have trouble swallowing      · Your bowel movements are very dark or black  · Your abdomen is hard and firm and you have severe pain  · You vomit blood  Contact your healthcare provider if:   · You feel full or bloated and cannot burp or pass gas  · You have not had a bowel movement for 3 days after your procedure  · You have neck pain  · You have a fever or chills  · You have nausea or are vomiting  · You have a rash or hives  · You have questions or concerns about your endoscopy  Relieve a sore throat:  Suck on throat lozenges or crushed ice  Gargle with a small amount of warm salt water  Mix 1 teaspoon of salt and 1 cup of warm water to make salt water  Relieve gas and discomfort from bloating:  Lie on your right side with a heating pad on your abdomen  Take short walks to help pass gas  Eat small meals until bloating is relieved  Rest after your procedure: You have been given medicine to relax you  Do not  drive or make important decisions until the day after your procedure  Return to your normal activity as directed  You can usually return to work the day after your procedure  Follow up with your healthcare provider as directed:  Write down your questions so you remember to ask them during your visits  © 2017 [address] is for End User's use only and may not be sold, redistributed or otherwise used for commercial purposes  All illustrations and images included in CareNotes® are the copyrighted property of [employer]  or [employer]  The above information is an  only  It is not intended as medical advice for individual conditions or treatments  Talk to your doctor, nurse or pharmacist before following any medical regimen to see if it is safe and effective for you

## 2018-02-15 NOTE — ANESTHESIA PREPROCEDURE EVALUATION
Review of Systems/Medical History  Patient summary reviewed  Chart reviewed  No history of anesthetic complications     Cardiovascular  Exercise tolerance: poor,  Hyperlipidemia, Hypertension , CAD, , History of CABG, SALCIDO,   Comment: Carotid artery stenosis  EF 60% ,  Pulmonary  Smoker cigarette smoker  , COPD , Sleep apnea ,        GI/Hepatic    PUD, GERD ,        Kidney transplant,        Endo/Other  Diabetes poorly controlled type 2 ,      GYN       Hematology   Musculoskeletal       Neurology  Seizures ,  CVA , Headaches,    Psychology   Anxiety, Depression ,              Physical Exam    Airway    Mallampati score: II  TM Distance: >3 FB  Neck ROM: limited     Dental   upper dentures,     Cardiovascular      Pulmonary      Other Findings        Anesthesia Plan  ASA Score- 3     Anesthesia Type- IV sedation with anesthesia with ASA Monitors  Additional Monitors:   Airway Plan:         Plan Factors- Patient instructed to abstain from smoking on day of procedure  Patient smoked on day of surgery  Induction- intravenous  Postoperative Plan-     Informed Consent- Anesthetic plan and risks discussed with patient

## 2018-02-15 NOTE — OP NOTE
OPERATIVE REPORT  PATIENT NAME: Akosua Sheth    :  1950  MRN: [id number]  Pt Location: [facility] OR ROOM 08    SURGERY DATE: 2/15/2018    Surgeon(s) and Role:     * Angela Landry DO - Primary    Preop Diagnosis:  * No pre-op diagnosis entered *    * No Diagnosis Codes entered *    Procedure(s) (LRB):  ESOPHAGOGASTRODUODENOSCOPY (EGD) with biopsy (N/A)    Specimen(s):  ID Type Source Tests Collected by Time Destination   1 : bx gastric body- r/o h pylori Tissue Stomach TISSUE EXAM Angela Landry DO 2/15/2018 1159        Estimated Blood Loss:   Minimal    Drains:       Anesthesia Type:   * No anesthesia type entered *    Operative Indications:  * No pre-op diagnosis entered *      Operative Findings:    ESOPHAGOGASTRODUODENOSCOPY    PROCEDURE: EGD    SEDATION: Monitored anesthesia care, check anesthesia records    ASA Class: 3    INDICATIONS: abdominal pain    CONSENT:  Informed consent was obtained for the procedure, including sedation after explaining the risks and benefits of the procedure  Risks including but not limited to bleeding, perforation, infection, and missed lesion  PREPARATION:   Telemetry, pulse oximetry, blood pressure were monitored throughout the procedure  Patient was identified by myself both verbally and by visual inspection of ID band  DESCRIPTION:   Patient was placed in the left lateral decubitus position and was sedated with the above medication  The gastroscope was introduced in to the oropharynx and the esophagus was intubated under direct visualization  Scope was passed down the esophagus up to 2nd part of the duodenum  A careful inspection was made as the gastroscope was withdrawn, including a retroflexed view of the stomach; findings and interventions are described below  FINDINGS:    #1  Esophagus- normal esophagus    #2  Stomach- mild gastritis, biopsied with cold forceps     #3   Duodenum- two subcentimeter duodenal ulcers in the second part of the duodenum, duodenitis         IMPRESSIONS:    Gastritis  Biopsied with cold forceps  Two small duodenal ulcers  Biopsied with cold forceps  Duodenitis  RECOMMENDATIONS:   Start PPI 40mg BID  Resume plavix from a GI standpoint  Continue to follow up with vascular surgeon as it seems unlikely that her pain is all due to these small ulcers but is possible  Can monitor her symptoms after 8 weeks of high dose twice daily PPI  Await biopsy results for H pylori status  Avoids NSAIDS  COMPLICATIONS:  None; patient tolerated the procedure well            DISPOSITION: PACU           CONDITION: Stable      SIGNATURE: Blu Jung DO  DATE: February 15, 2018  TIME: 12:00 PM

## 2018-03-07 NOTE — PROGRESS NOTES
History of Present Illness    Revaccination   Vaccine Information: Vaccine(s) Given (names): PNEUMOVAX 23     Spoke with patient regarding vaccine out of temperature range  Action(s): Pt will be revaccinated  Pt called (attempt 1): [id number] BL  Revaccination Completed: [id number]  Active Problems    1  Abdominal aortic aneurysm, without rupture (441 4) (I71 4)   2  Atherosclerotic PVD with intermittent claudication (440 21) (I73 9)   3  Carotid stenosis, asymptomatic, bilateral (433 10,433 30) (I65 23)   4  Convulsive disorder (780 39) (R56 9)   5  GERD without esophagitis (530 81) (K21 9)   6  On anticoagulant therapy (V58 61) (Z79 01)   7  Peripheral arterial disease (443 9) (I73 9)   8  Peripheral neuropathy (356 9) (G62 9)   9  Status post renal autotransplantation (V42 0) (Z94 0)    Immunizations  Influenza --- Ember Ruffin-Oct-2010; [place] Fails: [id number] Ok: [id number] Ferrari:  08-Oct-2013; Series5: 09-Oct-2014; Series6: 15-Oct-2015; Series7: 01-Sep-2016   Pneumo Other --- Series1: 07-Oct-2010     Current Meds   1  Acetaminophen 500 MG Oral Tablet; Take 2 caplets every 6 hours as needed  Donot take   more than 6 caplets in 24 hours   2  Acetaminophen-Codeine #3 300-30 MG Oral Tablet; TAKE 1 TABLET 3 TIMES DAILY AS   NEEDED FOR PAIN   3  Aspirin 81 MG TABS   4  Bumetanide 1 MG Oral Tablet; Take 1 tablet daily   5  ClonazePAM 0 5 MG Oral Tablet; TAKE 1 TABLET AT BEDTIME   6  Clopidogrel Bisulfate 75 MG Oral Tablet; Take 1 tablet daily   7  Divalproex Sodium 500 MG Oral Tablet Delayed Release; take 1 tablet twice a day   8  DULoxetine HCl - 30 MG Oral Capsule Delayed Release Particles; take 1 capsule daily   9  Famotidine 40 MG Oral Tablet; take 1 tablet twice a day   10  Fioricet -40 MG Oral Capsule; TAKE 1 CAPSULE Every 6 hours   11  FLUoxetine HCl - 20 MG Oral Capsule; take 1 capsule daily   12  Isosorbide Mononitrate ER 30 MG Oral Tablet Extended Release 24 Hour;  Take 1 tablet daily   13  Janumet  MG Oral Tablet; TAKE 0 5 TABLET Twice daily   14  Janumet  MG Oral Tablet; take 1 tablet twice a day   15  Lidocaine 5 % External Ointment; APPLY TO AFFECTED AREA FOR BACK PAIN 3 TIMES     A DAY   16  Magnesium Oxide 500 MG Oral Tablet; Take 1 tablet daily   17  Metoprolol Succinate ER 25 MG Oral Tablet Extended Release 24 Hour; take 1 tablet    twice a day   18  Mycophenolate Sodium 360 MG Oral Tablet Delayed Release; take 1 tablet twice a day   19  Omega-3 Fish Oil 1200 MG Oral Capsule; Take 1 capsule twice daily   20  Omeprazole 20 MG Oral Capsule Delayed Release; TAKE 1 CAPSULE EVERY          MORNING BEFORE BREAKFAST   21  Plavix 75 MG Oral Tablet   22  Ranexa 500 MG Oral Tablet Extended Release 12 Hour; Take 1 tablet twice daily   23  Simvastatin 40 MG Oral Tablet; TAKE 1 TABLET DAILY AS     DIRECTED   24  Tacrolimus 1 MG Oral Capsule; TAKE 2 CAPSULE Twice daily   25  TiZANidine HCl - 4 MG Oral Tablet; take 1 tablet bedtime prn muscle spasm   26  TraMADol HCl - 50 MG Oral Tablet; TAKE 1 TABLET EVERY 8 HOURS AS NEEDED    Allergies    1  No Known Drug Allergies    Plan    1   Pneumo (Pneumovax)    Future Appointments    Date/Time Provider Specialty Site   09/13/2017 11:00 AM Rasta Staton DO Family Medicine [facility]     Signatures   Electronically signed by : Brigitte Foote DO; Jun 12 2017  8:28AM EST                       (Author)

## 2021-07-18 NOTE — LETTER
February 15, 2018     Floyd Eckert DO  [address]    Patient: Duane Ser   YOB: 1950   Date of Visit: 2/14/2018       Dear Dr Zulma Omer:    Thank you for referring Tonya Prather to me for evaluation  Below are my notes for this consultation  If you have questions, please do not hesitate to call me  I look forward to following your patient along with you           Sincerely,        Wicho Ramirez DO        CC: No Recipients Calm

## 2022-03-29 NOTE — ASSESSMENT & PLAN NOTE
Discussed with PSR and MA regarding pt's symptoms; he reported facial weakness, inability to open eye. Advised that pt go to ED to rule out other differentials, such as stroke.     Thank you    Lisa Mcnamara MD  3/29/2022 8:45 AM        Mesenteric duplex suggestive of 70% SMA stenosis by velocity criteria

## 2023-12-18 NOTE — DISCHARGE SUMMARY
"[facility]  [address] [place] [address]    Discharge Summary - [address] [facility]    Patient Information: Conrad Shea 69 y o  female MRN: [id number]  Unit/Bed#: [facility] [address] Encounter: [id number]    Discharging Physician / Practitioner: Tabitha Montoya MD  PCP: Danae Min DO  Admission Date: 1/25/2018  Discharge Date: 01/28/18    Principal discharge diagnosis:  1  Diarrhea and abdominal pain - resolved  Likely viral gastroenteritis  2  Acute kidney injury - resolved  3  Asymptomatic superior mesenteric and celiac stenosis    Secondary diagnoses:  1  Polycystic kidney disease - status post renal transplant  2  Type 2 diabetes  3  Coronary artery disease status post CABG  4  Seizure disorder  5  Peripheral arterial disease  6  Asymptomatic 3 1 cm infrarenal AAA  7  Hyperlipidemia    Consultations During Hospital Stay:  Vascular surgery    Procedures Performed:   1   CT abdomen and pelvis without contrast - Status post left nephrectomy  Right lower quadrant renal allograft is unremarkable on noncontrast CT  No acute inflammatory process in the abdomen or pelvis  3 1 cm infrarenal abdominal aortic aneurysm is minimally changed since the prior study  Cholelithiasis  2   Mesenteric Doppler - There is an infra-renal abdominal aortic aneurysm, measuring approximately 3 1 cm  (AP) by 3 1 cm  (TRV)  Findings suggest a >70% stenosis in the visualized portions of the superior mesenteric artery and visualized portion of the celiac artery  However, this study is very technically limited, difficult due to abundant abdominal bowel gas and patient tenderness with probe pressure, and patient body habitus  The inferior mesenteric artery could not be identified     Outpatient Tests Requested:  BMP in 2-3 days    Hospital Course:     Conrad Shea is a 69 y o  female patient who originally presented to the hospital on 1/25/2018 with generalized abdominal pain and diarrhea  She has a history of peripheral artery disease   She also has polycystic kidney disease for which she has undergone a renal transplant  Stool test for Clostridium difficile toxin by PCR and enteric bacterial panel by PCR were negative  Her diarrhea and abdominal pain resolved in 1 day  Her symptoms were felt to be likely due to viral gastroenteritis  Mesenteric Doppler study suggested a greater than 70% stenosis in the visualized portions of the superior mesenteric artery and celiac artery and she was evaluated by vascular surgery  No vascular intervention was recommended at the present time but she has been advised to follow up with her primary vascular surgeon Dr Hunter Montemayor for surveillance  She had acute kidney injury on admission with a creatinine of 1 63 which improved to 1 04 on 01/28/2018 with intravenous fluids and holding diuretics  Her acute kidney injury was felt to be prerenal due to dehydration from her diarrhea and poor oral intake  Her transplant physician Dr Rebeka Owen from [facility] was contacted with the details of her hospital stay  Condition at Discharge: stable     Discharge Day Visit / Exam:     * Please refer to separate progress for these details *    Discharge instructions/Information to patient and family:   See after visit summary for information provided to patient and family  Provisions for Follow-Up Care:  See after visit summary for information related to follow-up care and any pertinent home health orders  Disposition: Home    Planned Readmission: No    Discharge Statement:  I spent 40 minutes discharging the patient  This time was spent on the day of discharge  I had direct contact with the patient on the day of discharge  Greater than 50% of the total time was spent examining patient, answering all patient questions, arranging and discussing plan of care with patient as well as directly providing post-discharge instructions  Additional time then spent on discharge activities      Discharge Medications:  See after visit summary for "CERVICAL, THORACIC, LUMBAR;  Surgeon: Maite Diagnostic Provider;  Location: [facility] OR 2ND FLR;  Service: Radiology;  Laterality: N/A;    OSTEOTOMY      REVISION, BACLOFEN PUMP Right 9/14/2021    Procedure: REVISION, BACLOFEN PUMP;  Surgeon: Genet Shaw MD;  Location: [facility] OR 2ND FLR;  Service: Neurosurgery;  Laterality: Right;    SPINAL FUSION N/A 9/14/2021    Procedure: FUSION, SPINE;  Surgeon: Andrew Ruiz MD;  Location: [facility] OR [facility] reconciled discharge medications provided to patient and family  ** Please Note: Dragon 360 Dictation voice to text software may have been used in the creation of this document   ** "

## (undated) DEVICE — SINGLE-USE BIOPSY FORCEPS: Brand: RADIAL JAW 4